# Patient Record
Sex: FEMALE | Race: BLACK OR AFRICAN AMERICAN | NOT HISPANIC OR LATINO | Employment: UNEMPLOYED | URBAN - METROPOLITAN AREA
[De-identification: names, ages, dates, MRNs, and addresses within clinical notes are randomized per-mention and may not be internally consistent; named-entity substitution may affect disease eponyms.]

---

## 2018-05-07 ENCOUNTER — HOSPITAL ENCOUNTER (EMERGENCY)
Facility: HOSPITAL | Age: 53
Discharge: HOME | End: 2018-05-07
Payer: MEDICAID

## 2018-05-07 VITALS
OXYGEN SATURATION: 99 % | WEIGHT: 179 LBS | DIASTOLIC BLOOD PRESSURE: 90 MMHG | BODY MASS INDEX: 32.94 KG/M2 | RESPIRATION RATE: 17 BRPM | SYSTOLIC BLOOD PRESSURE: 179 MMHG | HEART RATE: 97 BPM | HEIGHT: 62 IN | TEMPERATURE: 98 F

## 2018-05-07 DIAGNOSIS — N81.2 INCOMPLETE UTERINE PROLAPSE: Primary | ICD-10-CM

## 2018-05-07 PROCEDURE — 99283 EMERGENCY DEPT VISIT LOW MDM: CPT

## 2018-05-07 NOTE — ED PROVIDER NOTES
There are no discharge medications for this patient.   eMERGENCY dEPARTMENT eNCOUnter    CHIEF COMPLAINT    Chief Complaint   Patient presents with    Vaginal Bleeding     pt states she has a blood clot coming from her vagina noticed about 30 min pta when she went to the restroom. states this is the appropriate time for her cycle. denies any pain.        HPI    Corrine Malik is a 52 y.o. female who presents to the ED with blood clot in vagina. States on cycle now and went to bathroom to urinate and felt pressure. States feels like a clot or something is in there. She further reports that she coughed while urinating. She denies dysuria. She is a A0.      CURRENT MEDICATIONS    No current facility-administered medications on file prior to encounter.      Current Outpatient Prescriptions on File Prior to Encounter   Medication Sig Dispense Refill    [DISCONTINUED] hydrocodone-acetaminophen 5-325mg (NORCO) 5-325 mg per tablet Take 1 tablet by mouth every 6 (six) hours as needed for Pain.      [DISCONTINUED] ibuprofen (ADVIL,MOTRIN) 800 MG tablet Take 1 tablet (800 mg total) by mouth 3 (three) times daily. 20 tablet 0         ALLERGIES    Review of patient's allergies indicates:   Allergen Reactions    Peas Swelling       PAST MEDICAL HISTORY  History reviewed. No pertinent past medical history.    SURGICAL HISTORY    History reviewed. No pertinent surgical history.    SOCIAL HISTORY    Social History     Social History    Marital status: Single     Spouse name: N/A    Number of children: N/A    Years of education: N/A     Social History Main Topics    Smoking status: Never Smoker    Smokeless tobacco: Never Used    Alcohol use No    Drug use: No    Sexual activity: Not Asked     Other Topics Concern    None     Social History Narrative    None       FAMILY HISTORY    History reviewed. No pertinent family history.    REVIEW OF SYSTEMS   ROS  Constitutional:  No fever, chills, weight loss or  "weakness.   Eyes:  No  Photophobia, blurred vision or discharge.   HENT:  No ear pain, nasal congestion or sore throat..  Respiratory:  No cough, shortness of breath or wheezing.   Cardiovascular:  No chest pain, palpitations or swelling.   GI:  No abdominal pain, nausea, vomiting, or diarrhea.  : No dysuria, frequency. Reports vaginal pressure.   Musculoskeletal:  No back pain or neck pain.   Skin:  No reported rashes or infected lesions.   Neurologic:  No reported headache  All Systems otherwise negative except as noted in the History of Present Illness.        PHYSICAL EXAM    Reviewed Triage Note  VITAL SIGNS: BP (!) 199/99   Pulse 107   Temp 98.3 °F (36.8 °C) (Oral)   Resp 16   Ht 5' 2" (1.575 m)   Wt 81.2 kg (179 lb)   LMP 05/04/2018   SpO2 99%   BMI 32.74 kg/m²    Vitals:    05/07/18 1326   BP: (!) 199/99   Pulse: 107   Resp: 16   Temp: 98.3 °F (36.8 °C)       Physical Exam  Nursing Notes and Vital Signs Reviewed  Constitutional:  Well-developed, well-nourished, elderly black female in NAD.  HENT:  Normocephalic, atraumatic. Bilateral external EACs normal. Nose normal, no rhinorrhea. Mouth mucus membranes P & M.   Eyes:  PERRL EOMI. Conjunctiva normal without discharge.   Neck: Normal range of motion. No midline tenderness or vertebral step-off. No stridor. No meningismus. No lymphadenopathy.   Respiratory:  Normal breath sounds bilaterally.  No respiratory distress, retractions, or conversational dyspnea. No wheezing. No rhonchi. No rales.   Cardiovascular:  Normal heart rate. Normal rhythm. No pitting lower extremity edema.   GI:  Abdomen soft, non-distended, non-tender. Normal bowel sounds. No guarding, rigidity or rebound.    : No CVA tenderness. No genital lesions. Scant blood noted on exam. Uterus appears to be prolapsed into the vaginal canal, but is not outside the body.   Integument: No rash. No petechiae.  Neurologic:   Alert and Interactive. MAEW. Gait steady.   Psychiatric:  Affect " normal. Mood normal.         LABS  Pertinent labs reviewed. (See chart for details)           RADIOLOGY    Imaging Results    None         PROCEDURES    Procedures      EKG         ED COURSE & MEDICAL DECISION MAKING    Pertinent & Imaging studies reviewed. (See chart for details and specific orders.)  No heavy bleeding. No clots or tissue in the vault. Appears to have uterine prolapse. No difficulty urinating or defecating. Advised f/u GYN return if concerns.    Medications - No data to display        FINAL IMPRESSION    1. Incomplete uterine prolapse        Differential Diagnosis: Threatened AB                                        Bladder Prolapse                                        UTI     Patient advised to follow-up with PCP for re-check and BP re-check.                   Blanca Jacobson, SARAH  05/07/18 [id number]

## 2018-09-13 ENCOUNTER — HOSPITAL ENCOUNTER (OUTPATIENT)
Dept: RADIOLOGY | Facility: HOSPITAL | Age: 53
Discharge: HOME | End: 2018-09-13
Attending: NURSE PRACTITIONER
Payer: MEDICAID

## 2018-09-13 ENCOUNTER — HOSPITAL ENCOUNTER (OUTPATIENT)
Facility: HOSPITAL | Age: 53
LOS: 1 days | Discharge: HOME | End: 2018-09-14
Attending: EMERGENCY MEDICINE | Admitting: INTERNAL MEDICINE
Payer: MEDICAID

## 2018-09-13 DIAGNOSIS — R07.9 CHEST PAIN: ICD-10-CM

## 2018-09-13 DIAGNOSIS — R06.02 SHORTNESS OF BREATH: ICD-10-CM

## 2018-09-13 DIAGNOSIS — D64.9 ANEMIA: ICD-10-CM

## 2018-09-13 DIAGNOSIS — R06.02 SHORTNESS OF BREATH: Primary | ICD-10-CM

## 2018-09-13 DIAGNOSIS — D64.9 SYMPTOMATIC ANEMIA: ICD-10-CM

## 2018-09-13 LAB
ABO + RH BLD: NORMAL
APTT BLDCRRT: 21.9 SEC
BLD GP AB SCN CELLS X3 SERPL QL: NORMAL
FERRITIN SERPL-MCNC: 3 NG/ML
INR PPP: 1.1
PROTHROMBIN TIME: 11.3 SEC

## 2018-09-13 PROCEDURE — 93010 ELECTROCARDIOGRAM REPORT: CPT | Mod: ,,, | Performed by: INTERNAL MEDICINE

## 2018-09-13 PROCEDURE — 85610 PROTHROMBIN TIME: CPT

## 2018-09-13 PROCEDURE — 86901 BLOOD TYPING SEROLOGIC RH(D): CPT

## 2018-09-13 PROCEDURE — 83540 ASSAY OF IRON: CPT

## 2018-09-13 PROCEDURE — 36430 TRANSFUSION BLD/BLD COMPNT: CPT

## 2018-09-13 PROCEDURE — G0378 HOSPITAL OBSERVATION PER HR: HCPCS

## 2018-09-13 PROCEDURE — 86920 COMPATIBILITY TEST SPIN: CPT

## 2018-09-13 PROCEDURE — 99285 EMERGENCY DEPT VISIT HI MDM: CPT

## 2018-09-13 PROCEDURE — 93005 ELECTROCARDIOGRAM TRACING: CPT

## 2018-09-13 PROCEDURE — 25000003 PHARM REV CODE 250: Performed by: OBSTETRICS & GYNECOLOGY

## 2018-09-13 PROCEDURE — 82728 ASSAY OF FERRITIN: CPT

## 2018-09-13 PROCEDURE — P9021 RED BLOOD CELLS UNIT: HCPCS

## 2018-09-13 PROCEDURE — 99219 PR INITIAL OBSERVATION CARE,LEVL II: CPT | Mod: ,,, | Performed by: OBSTETRICS & GYNECOLOGY

## 2018-09-13 PROCEDURE — 71046 X-RAY EXAM CHEST 2 VIEWS: CPT | Mod: TC,FY,PO

## 2018-09-13 PROCEDURE — 85730 THROMBOPLASTIN TIME PARTIAL: CPT

## 2018-09-13 RX ORDER — MEDROXYPROGESTERONE ACETATE 2.5 MG/1
10 TABLET ORAL 2 TIMES DAILY
Status: DISCONTINUED | OUTPATIENT
Start: 2018-09-13 | End: 2018-09-14 | Stop reason: HOSPADM

## 2018-09-13 RX ORDER — HYDROCODONE BITARTRATE AND ACETAMINOPHEN 500; 5 MG/1; MG/1
TABLET ORAL
Status: DISCONTINUED | OUTPATIENT
Start: 2018-09-13 | End: 2018-09-14 | Stop reason: HOSPADM

## 2018-09-13 RX ORDER — MEDROXYPROGESTERONE ACETATE 10 MG/1
10 TABLET ORAL 2 TIMES DAILY
Qty: 30 TABLET | Refills: 0 | Status: SHIPPED | OUTPATIENT
Start: 2018-09-13 | End: 2018-09-28 | Stop reason: SDUPTHER

## 2018-09-13 RX ADMIN — MEDROXYPROGESTERONE ACETATE 10 MG: 2.5 TABLET ORAL at 09:09

## 2018-09-13 NOTE — ED NOTES
Admitting team notified of D dimer result. MD also notified tachycardia on exertion. States will call back with further instructions. MD later called back and states ok for pt to go to the floor.

## 2018-09-13 NOTE — ED PROVIDER NOTES
Encounter Date: 9/13/2018    SCRIBE #1 NOTE: I, Rajesh Cespedes, am scribing for, and in the presence of,  Dr. Cain Posadas. I have scribed the entire note.       History     Chief Complaint   Patient presents with    Other     c/o low blood count sent here from blood tansfusion.      Corrine Malik is a 53 y.o. female who presents to the ED for further evaluation of low blood count. Patient reports last week had a prolapsed uterus. She had an appointment with PCP today and blood work done with . States last month she was bleeding heavily. She admits to intermittent heavy vaginal bleeding that occurs every other day, currently resolved and some SOB with exertion. She denies any skin color changes, headache, chest pain, vomiting, diarrhea, black/bloody stool, focal numbness or tingling. Patient states her PCP believes bleeding could be from fibroids and not from prolapsed uterus. She denies any pain.        The history is provided by the patient.     Review of patient's allergies indicates:   Allergen Reactions    Peas Swelling     No past medical history on file.  No past surgical history on file.  No family history on file.  Social History     Tobacco Use    Smoking status: Never Smoker    Smokeless tobacco: Never Used   Substance Use Topics    Alcohol use: No    Drug use: No     Review of Systems   Constitutional: Negative for chills and fever.   HENT: Negative for congestion, rhinorrhea and sore throat.    Eyes: Negative for redness and visual disturbance.   Respiratory: Positive for shortness of breath. Negative for cough and wheezing.    Cardiovascular: Negative for chest pain and palpitations.   Gastrointestinal: Negative for abdominal pain, blood in stool, diarrhea, nausea and vomiting.   Genitourinary: Positive for vaginal bleeding. Negative for dysuria and hematuria.   Musculoskeletal: Negative for back pain, myalgias and neck pain.   Skin: Negative for color change and rash.   Neurological: Negative for  dizziness, weakness, light-headedness, numbness and headaches.   Psychiatric/Behavioral: Negative for confusion.   All other systems reviewed and are negative.      Physical Exam     Initial Vitals [09/13/18 1427]   BP Pulse Resp Temp SpO2   (!) 149/73 109 20 98.4 °F (36.9 °C) 98 %      MAP       --         Physical Exam    Nursing note and vitals reviewed.  Constitutional: She appears well-developed and well-nourished. No distress.   HENT:   Head: Normocephalic and atraumatic.   Eyes: EOM are normal. Pupils are equal, round, and reactive to light.   Pale conjunctivae.   Neck: Normal range of motion. Neck supple.   Cardiovascular: Regular rhythm, normal heart sounds and intact distal pulses.   No murmur heard.  Tachycardia.   Pulmonary/Chest: Breath sounds normal. No respiratory distress. She has no wheezes.   Abdominal: Soft. She exhibits no distension. There is no tenderness.   Genitourinary:   Genitourinary Comments: Some blood noted in vaginal vault. No signs of active hemorrhage  Uterus prolapse.   External genitalia normal.   Musculoskeletal: Normal range of motion. She exhibits no edema or tenderness.   Neurological: She is alert and oriented to person, place, and time. She has normal strength.   Skin: Skin is dry.   Psychiatric: She has a normal mood and affect. Thought content normal.         ED Course   Procedures  Labs Reviewed   FERRITIN - Abnormal; Notable for the following components:       Result Value    Ferritin 3 (*)     All other components within normal limits   PROTIME-INR   APTT   FERRITIN   IRON AND TIBC   TYPE & SCREEN     EKG Readings: (Independently Interpreted)   EKG:Rate: 101 bpm. Sinus tachycardia. LBBB. Sgarbossa negative       Imaging Results          US Pelvis Comp with Transvag NON-OB (xpd (Final result)  Result time 09/13/18 16:36:44    Final result by Georgette Clemente MD (09/13/18 16:36:44)                 Impression:      Numerous uterine fibroids.    The endometrium could not  be assessed, it is not well visualized.      Electronically signed by: Georgette Clemente MD  Date:    09/13/2018  Time:    16:36             Narrative:    EXAMINATION:  US PELVIS COMP WITH TRANSVAG NON-OB (XPD)    CLINICAL HISTORY:  vaginal bleeding x 2 months hg 5.8;    TECHNIQUE:  Transabdominal sonography of the pelvis was performed, followed by transvaginal sonography to better evaluate the uterus and ovaries.    COMPARISON:  None.    FINDINGS:  Uterus:    Size: 14.4 x 6.9 x 9.1 cm    Masses: There are numerous uterine fibroids, 3 are measured, one intramural inferiorly measure 5.7 cm.  One intramural near the fundus measures 6.3 cm.  One subserosal posteriorly measures 3.6 cm.    Endometrium: It is not well seen..    The bilateral ovaries were not visualized.  The bilateral adnexa appear normal.    Free Fluid:    None.                                 Medical Decision Making:   Initial Assessment:   Patient presents with hemoglobin below 6 shortness of breath and increased fatigue.  Vital signs stable will perform pelvic exam type and screen and admit  Differential Diagnosis:   GI bleed, iron deficiency, anemia, megaloblastic anemia, due to blood loss  Clinical Tests:   Lab Tests: Ordered and Reviewed  Radiological Study: Ordered and Reviewed  Medical Tests: Ordered and Reviewed  ED Management:  4:56 PM Case discussed with Dr. Cormier with OBGYN, no indication for emergent D&C at this time, recommends transfusion and arrange clinic follow up.    5:15 Case discussed with [facility] Medicine Resident who will admit patient.                   ED Course as of Sep 13 1652   Thu Sep 13, 2018   1647 Transvaginal ultrasound shows numerous uterine fibroid US Pelvis Comp with Transvag NON-OB (xpd [RN]   1648 US Pelvis Comp with Transvag NON-OB (xpd [RN]      ED Course User Index  [RN] Cain Posadas Jr., MD     Clinical Impression:     1. Chest pain    2. Anemia    3. Symptomatic anemia                  I, Cain Psoadas,   personally performed the services described in this documentation. All medical record entries made by the scribe were at my direction and in my presence.  I have reviewed the chart and agree that the record reflects my personal performance and is accurate and complete. Cain Posadas M.D. 1:02 PM09/16/2018                 Cain Posadas Jr., MD  09/16/18 [id number]

## 2018-09-13 NOTE — ED NOTES
Pt sitting upright in bed, a/a/ao x4. NAD noted. Respirations even, unlabored.  Will continue to monitor.

## 2018-09-13 NOTE — H&P
[facility] Medicine H&P Note     Admitting Team: [facility] Hospitalist Team B  Attending Physician: Dr. Jackson  Resident: Dr. Barnes  Intern: Dr. Timmons     Date of Admit: 2018    Chief Complaint     Shortness of breath x2, abnormal lab (c/o low blood count per PCP, sent here for blood transfusion)    Subjective:      History of Present Illness:  Corrine Malik is a  53 y.o. female who has no past medical history. The patient presented to [facility] on 2018 with a primary complaint of shortness of breath x2 and abnormal lab (c/o low blood count per PCP, sent here for blood transfusion).    Patient was in her usual state of health, independent of all ADLs, until 2 weeks prior to admit. Patient states she was rushing through the airport and had acute shortness of breath requiring her to rest several times before getting to her car. Dyspnea resolved with rest and patient had no further episodes until morning of admit when she was rushing to get to a doctor's appointment. At PCP's office, H/H was 5.8/20.7 and patient instructed to come to ED. Patient denies chest pain or LE swelling. Does report non-productive cough x3 days.     Notably patient has had almost daily vaginal bleeding for the last three months. Diagnosed with an incomplete prolapsed uterus during ED visit 2018 for vaginal bleeding/clot, instructed to follow up with PCP or Gyn for further episodes which patient did not do. Reports bleeding occurs almost daily, alternates between heavy clots and spotting. Cannot quantify number of menstrual pads she is using. Patient has been post-menopausal for ~8 years. She reports a family history of uterine fibroids in her mother and four sisters, many of whom have had hysterectomies as a result. Patient denies abdominal pain, h/o abdominal surgeries, hematemesis, melena, history of bleeding disorder. Patient is not actively bleeding.      Past Medical History:  Per patient, no past  "medical history.    Past Surgical History:  Per patient, no past surgical history.    Allergies:  Review of patient's allergies indicates:   Allergen Reactions    Peas Swelling       Home Medications:  None    Family History:  Mother - fibroids  4 sisters - fibroids  Father - HTN  Maternal grandmother - DM     Social History:  Tobacco - denies  Etoh - denies  Drugs - denies     Review of Systems:  All other systems are reviewed and are negative.    Health Maintaince :   Primary Care Physician: Dr. Garland    Immunizations:   TDap: not up to date     Flu: not up to date   Pna: not up to date     Cancer Screening:  PAP: per patient, last 3 years ago, no h/o abnormal  MMG: per patient, last 3 years ago, no h/o abnormal  Colonoscopy: per patient, no prior screenings      Objective:   Last 24 Hour Vital Signs:  BP  Min: 149/73  Max: 160/83  Temp  Av.4 °F (36.9 °C)  Min: 98.4 °F (36.9 °C)  Max: 98.4 °F (36.9 °C)  Pulse  Av  Min: 87  Max: 109  Resp  Av.3  Min: 16  Max: 20  SpO2  Av %  Min: 98 %  Max: 100 %  Height  Av' 2" (157.5 cm)  Min: 5' 2" (157.5 cm)  Max: 5' 2" (157.5 cm)  Weight  Av.6 kg (180 lb)  Min: 81.6 kg (180 lb)  Max: 81.6 kg (180 lb)  Body mass index is 32.92 kg/m².  No intake/output data recorded.    Physical Examination:    BP (!) 160/83   Pulse 83   Temp 98.4 °F (36.9 °C) (Oral)   Resp 16   Ht 5' 2" (1.575 m)   Wt 81.6 kg (180 lb)   SpO2 100%   BMI 32.92 kg/m²     General Appearance:    Awake and alert, in no apparent distress, well nourished and well developed, cooperative and pleasant on interview   Head:    Normocephalic, without obvious abnormality, atraumatic   Eyes:    PERRL, conjunctiva/corneas clear, EOM's intact, no scleral icterus, no conjunctival pallor    Ears:    Normal TM's and external ear canals, both ears   Nose:   Nares normal, septum midline, mucosa normal, no drainage    or sinus tenderness   Throat:   Lips, mucosa, and tongue normal; teeth and gums " normal; moist mucus membranes    Neck:   Supple, symmetrical, trachea midline, no adenopathy;     thyroid:  no enlargement/tenderness/nodules; no carotid    bruit or JVP   Lungs:     Clear to auscultation bilaterally, respirations unlabored, no increased work of breathing    Heart:    Regular rhythm, normal rate, S1 and S2 normal, no murmur, rub   or gallop   Abdomen:     Soft, non-tender, bowel sounds active all four quadrants,     no masses, no organomegaly   Extremities:   Extremities normal, atraumatic, no cyanosis or edema   Pulses:   2+ and symmetric (DP and radial)   Skin:   Skin color, texture, turgor normal, no rashes/lesions/bruising   Lymph nodes:   Cervical, supraclavicular, and axillary nodes normal   Neurologic:   AAOx3, GCS 15, no focal neurologic deficits noted          Laboratory:  Most Recent Data:  CBC:   Lab Results   Component Value Date    WBC 10.95 09/13/2018    HGB 5.8 (LL) 09/13/2018    HCT 20.7 (L) 09/13/2018     09/13/2018    MCV 70 (L) 09/13/2018    RDW 17.6 (H) 09/13/2018     WBC Differential: 56% N, 34.6% L, 7.2% M, 1.6% Eo, 0.3% Baso    BMP:   Lab Results   Component Value Date     09/13/2018    K 3.5 09/13/2018     09/13/2018    CO2 24 09/13/2018    BUN 15 09/13/2018    CREATININE 0.88 09/13/2018    GLU 96 09/13/2018    CALCIUM 9.6 09/13/2018     LFTs:   Lab Results   Component Value Date    PROT 8.1 09/13/2018    ALBUMIN 4.3 09/13/2018    BILITOT 0.2 09/13/2018    AST 19 09/13/2018    ALKPHOS 82 09/13/2018    ALT 14 09/13/2018     Coags:   Lab Results   Component Value Date    INR 1.1 09/13/2018     FLP:   Lab Results   Component Value Date    CHOL 156 09/13/2018    HDL 38 (L) 09/13/2018    LDLCALC 102.6 09/13/2018    TRIG 77 09/13/2018    CHOLHDL 24.4 09/13/2018     DM:   Lab Results   Component Value Date    HGBA1C 5.2 09/13/2018    LDLCALC 102.6 09/13/2018    CREATININE 0.88 09/13/2018     Thyroid:   Lab Results   Component Value Date    TSH 1.100 09/13/2018        Trended Lab Data:  Recent Labs   Lab  09/13/18   1056   WBC  10.95   HGB  5.8*   HCT  20.7*   PLT  260   MCV  70*   RDW  17.6*   NA  140   K  3.5   CL  107   CO2  24   BUN  15   CREATININE  0.88   GLU  96   PROT  8.1   ALBUMIN  4.3   BILITOT  0.2   AST  19   ALKPHOS  82   ALT  14       Trended Cardiac Data:  No results for input(s): TROPONINI, CKTOTAL, CKMB, BNP in the last 168 hours.    Microbiology Data:  None this admit     Other Results:  9/13 Admit EKG (my interpretation): normal sinus rhythm     Radiology:  Imaging Results          US Pelvis Comp with Transvag NON-OB (xpd (Final result)  Result time 09/13/18 16:36:44    Final result by Georgette Clemente MD (09/13/18 16:36:44)                 Impression:      Numerous uterine fibroids.    The endometrium could not be assessed, it is not well visualized.      Electronically signed by: Georgette Clemente MD  Date:    09/13/2018  Time:    16:36             Narrative:    EXAMINATION:  US PELVIS COMP WITH TRANSVAG NON-OB (XPD)    CLINICAL HISTORY:  vaginal bleeding x 2 months hg 5.8;    TECHNIQUE:  Transabdominal sonography of the pelvis was performed, followed by transvaginal sonography to better evaluate the uterus and ovaries.    COMPARISON:  None.    FINDINGS:  Uterus:    Size: 14.4 x 6.9 x 9.1 cm    Masses: There are numerous uterine fibroids, 3 are measured, one intramural inferiorly measure 5.7 cm.  One intramural near the fundus measures 6.3 cm.  One subserosal posteriorly measures 3.6 cm.    Endometrium: It is not well seen..    The bilateral ovaries were not visualized.  The bilateral adnexa appear normal.    Free Fluid:    None.                                 Assessment:     Corrine Malik is a 53 y.o. female with:  Patient Active Problem List    Diagnosis Date Noted    Anemia 09/13/2018    Symptomatic anemia 09/13/2018        Plan:     Symptomatic anemia with MUNOZ   - pt reports shortness of breath with exertion x2 (first episode 2 weeks  ago, second on day of admit)  - has had almost daily vaginal bleeding x3 months, multiple uterine fibroids seen on transvaginal ultrasound    - H/H prior to admit: 5.8/20.7  - pt not actively bleeding, hemodynamically stable, otherwise asymptomatic  - will transfuse 2U PRBCs, follow up CBC  - iron studies ordered     Vaginal bleeding   - first reported 5/2018 in ED, diagnosed with uterine prolapse  - 9/13/18 transvaginal ultrasound: multiple uterine fibroids   - Gyn to see, appreciate recs    Elevated D-Dimer   - pt recently traveled by airplane across country when SOB began  - low suspicion for PE as patient has dyspnea only with exertion, intermittent time course unlikely ,stable vitals, denies chest pain   - will order bilateral DVT US, follow up     Code Status:         Bernadine Og MD  [facility] Medicine Firm [facility]       [facility] Medicine Hospitalist Pager numbers:   [facility] Hospitalist Medicine Team A (Adrienne/Yeyo): [phone]  [facility] Hospitalist Medicine Team B (Manuel/Stephanie):  [phone]

## 2018-09-13 NOTE — ED NOTES
While pt moving onto pelvic pillow HR noted in 110s. When pt stopped moving HR decreased to NSR.  NAD noted. Will continue to monitor.

## 2018-09-13 NOTE — ED NOTES
Pt c/o vaginal bleeding for over 1 month. Pt denies daily bleeding, but states when she has bleeding it is heavy with clots. Pt denies bleeding at this time.  Pt states she was seen per PCP today and told to come to ED d/t low H&H and need for transfusion. Pt states she was SOB approx 2 weeks ago while at airport, denies SOB since. Pt denies pain.

## 2018-09-14 ENCOUNTER — OFFICE VISIT (OUTPATIENT)
Dept: OBSTETRICS AND GYNECOLOGY | Facility: CLINIC | Age: 53
End: 2018-09-14
Payer: MEDICAID

## 2018-09-14 VITALS
TEMPERATURE: 98 F | HEIGHT: 62 IN | HEART RATE: 94 BPM | RESPIRATION RATE: 16 BRPM | DIASTOLIC BLOOD PRESSURE: 65 MMHG | SYSTOLIC BLOOD PRESSURE: 122 MMHG | WEIGHT: 180 LBS | BODY MASS INDEX: 33.13 KG/M2 | OXYGEN SATURATION: 98 %

## 2018-09-14 VITALS
BODY MASS INDEX: 33.13 KG/M2 | HEIGHT: 62 IN | SYSTOLIC BLOOD PRESSURE: 148 MMHG | DIASTOLIC BLOOD PRESSURE: 88 MMHG | WEIGHT: 180 LBS

## 2018-09-14 DIAGNOSIS — Z12.31 SCREENING MAMMOGRAM, ENCOUNTER FOR: ICD-10-CM

## 2018-09-14 DIAGNOSIS — Z01.419 ENCOUNTER FOR ANNUAL ROUTINE GYNECOLOGICAL EXAMINATION: Primary | ICD-10-CM

## 2018-09-14 DIAGNOSIS — D64.9 SYMPTOMATIC ANEMIA: ICD-10-CM

## 2018-09-14 DIAGNOSIS — N88.8 MASS OF CERVIX: ICD-10-CM

## 2018-09-14 DIAGNOSIS — D25.9 UTERINE LEIOMYOMA, UNSPECIFIED LOCATION: ICD-10-CM

## 2018-09-14 LAB
ALBUMIN SERPL BCP-MCNC: 3.5 G/DL
ALBUMIN SERPL BCP-MCNC: 3.6 G/DL
ALP SERPL-CCNC: 71 U/L
ALP SERPL-CCNC: 74 U/L
ALT SERPL W/O P-5'-P-CCNC: 12 U/L
ALT SERPL W/O P-5'-P-CCNC: 12 U/L
ANION GAP SERPL CALC-SCNC: 6 MMOL/L
ANION GAP SERPL CALC-SCNC: 7 MMOL/L
ANISOCYTOSIS BLD QL SMEAR: ABNORMAL
AST SERPL-CCNC: 15 U/L
AST SERPL-CCNC: 15 U/L
BASOPHILS # BLD AUTO: 0.02 K/UL
BASOPHILS # BLD AUTO: 0.02 K/UL
BASOPHILS NFR BLD: 0.1 %
BASOPHILS NFR BLD: 0.1 %
BILIRUB SERPL-MCNC: 1 MG/DL
BILIRUB SERPL-MCNC: 1.1 MG/DL
BLD PROD TYP BPU: NORMAL
BLD PROD TYP BPU: NORMAL
BLOOD UNIT EXPIRATION DATE: NORMAL
BLOOD UNIT EXPIRATION DATE: NORMAL
BLOOD UNIT TYPE CODE: 5100
BLOOD UNIT TYPE CODE: 5100
BLOOD UNIT TYPE: NORMAL
BLOOD UNIT TYPE: NORMAL
BUN SERPL-MCNC: 14 MG/DL
BUN SERPL-MCNC: 14 MG/DL
CALCIUM SERPL-MCNC: 8.9 MG/DL
CALCIUM SERPL-MCNC: 9 MG/DL
CHLORIDE SERPL-SCNC: 106 MMOL/L
CHLORIDE SERPL-SCNC: 107 MMOL/L
CO2 SERPL-SCNC: 25 MMOL/L
CO2 SERPL-SCNC: 25 MMOL/L
CODING SYSTEM: NORMAL
CODING SYSTEM: NORMAL
CREAT SERPL-MCNC: 0.8 MG/DL
CREAT SERPL-MCNC: 0.8 MG/DL
DIFFERENTIAL METHOD: ABNORMAL
DIFFERENTIAL METHOD: ABNORMAL
DISPENSE STATUS: NORMAL
DISPENSE STATUS: NORMAL
EOSINOPHIL # BLD AUTO: 0.3 K/UL
EOSINOPHIL # BLD AUTO: 0.4 K/UL
EOSINOPHIL NFR BLD: 2.2 %
EOSINOPHIL NFR BLD: 2.7 %
ERYTHROCYTE [DISTWIDTH] IN BLOOD BY AUTOMATED COUNT: 19.7 %
ERYTHROCYTE [DISTWIDTH] IN BLOOD BY AUTOMATED COUNT: 20.4 %
EST. GFR  (AFRICAN AMERICAN): >60 ML/MIN/1.73 M^2
EST. GFR  (AFRICAN AMERICAN): >60 ML/MIN/1.73 M^2
EST. GFR  (NON AFRICAN AMERICAN): >60 ML/MIN/1.73 M^2
EST. GFR  (NON AFRICAN AMERICAN): >60 ML/MIN/1.73 M^2
GLUCOSE SERPL-MCNC: 113 MG/DL
GLUCOSE SERPL-MCNC: 122 MG/DL
HCT VFR BLD AUTO: 25.7 %
HCT VFR BLD AUTO: 27.3 %
HGB BLD-MCNC: 7.9 G/DL
HGB BLD-MCNC: 8.1 G/DL
HYPOCHROMIA BLD QL SMEAR: ABNORMAL
IRON SERPL-MCNC: 18 UG/DL
LYMPHOCYTES # BLD AUTO: 2.8 K/UL
LYMPHOCYTES # BLD AUTO: 2.9 K/UL
LYMPHOCYTES NFR BLD: 18.8 %
LYMPHOCYTES NFR BLD: 21.3 %
MCH RBC QN AUTO: 21.9 PG
MCH RBC QN AUTO: 22.5 PG
MCHC RBC AUTO-ENTMCNC: 29.7 G/DL
MCHC RBC AUTO-ENTMCNC: 30.7 G/DL
MCV RBC AUTO: 73 FL
MCV RBC AUTO: 74 FL
MONOCYTES # BLD AUTO: 0.6 K/UL
MONOCYTES # BLD AUTO: 0.6 K/UL
MONOCYTES NFR BLD: 4.2 %
MONOCYTES NFR BLD: 4.5 %
NEUTROPHILS # BLD AUTO: 11.1 K/UL
NEUTROPHILS # BLD AUTO: 9.6 K/UL
NEUTROPHILS NFR BLD: 71.4 %
NEUTROPHILS NFR BLD: 74.5 %
NUM UNITS TRANS PACKED RBC: NORMAL
OVALOCYTES BLD QL SMEAR: ABNORMAL
PLATELET # BLD AUTO: 224 K/UL
PLATELET # BLD AUTO: 228 K/UL
PMV BLD AUTO: 10.2 FL
PMV BLD AUTO: 10.6 FL
POIKILOCYTOSIS BLD QL SMEAR: SLIGHT
POLYCHROMASIA BLD QL SMEAR: ABNORMAL
POTASSIUM SERPL-SCNC: 3.5 MMOL/L
POTASSIUM SERPL-SCNC: 3.6 MMOL/L
PROT SERPL-MCNC: 6.8 G/DL
PROT SERPL-MCNC: 7 G/DL
RBC # BLD AUTO: 3.51 M/UL
RBC # BLD AUTO: 3.7 M/UL
SATURATED IRON: 3 %
SODIUM SERPL-SCNC: 138 MMOL/L
SODIUM SERPL-SCNC: 138 MMOL/L
TOTAL IRON BINDING CAPACITY: 562 UG/DL
TRANS ERYTHROCYTES VOL PATIENT: NORMAL ML
TRANSFERRIN SERPL-MCNC: 380 MG/DL
WBC # BLD AUTO: 13.44 K/UL
WBC # BLD AUTO: 14.91 K/UL

## 2018-09-14 PROCEDURE — 99213 OFFICE O/P EST LOW 20 MIN: CPT | Mod: PBBFAC,25,PO | Performed by: OBSTETRICS & GYNECOLOGY

## 2018-09-14 PROCEDURE — 99225 PR SUBSEQUENT OBSERVATION CARE,LEVEL II: CPT | Mod: ,,, | Performed by: OBSTETRICS & GYNECOLOGY

## 2018-09-14 PROCEDURE — 57500 BIOPSY OF CERVIX: CPT | Mod: PBBFAC,PO | Performed by: OBSTETRICS & GYNECOLOGY

## 2018-09-14 PROCEDURE — 88305 TISSUE EXAM BY PATHOLOGIST: CPT | Performed by: PATHOLOGY

## 2018-09-14 PROCEDURE — 36415 COLL VENOUS BLD VENIPUNCTURE: CPT

## 2018-09-14 PROCEDURE — G0378 HOSPITAL OBSERVATION PER HR: HCPCS

## 2018-09-14 PROCEDURE — 85025 COMPLETE CBC W/AUTO DIFF WBC: CPT

## 2018-09-14 PROCEDURE — 99396 PREV VISIT EST AGE 40-64: CPT | Mod: 25,S$PBB,, | Performed by: OBSTETRICS & GYNECOLOGY

## 2018-09-14 PROCEDURE — 99999 PR PBB SHADOW E&M-EST. PATIENT-LVL III: CPT | Mod: PBBFAC,,, | Performed by: OBSTETRICS & GYNECOLOGY

## 2018-09-14 PROCEDURE — P9016 RBC LEUKOCYTES REDUCED: HCPCS

## 2018-09-14 PROCEDURE — 36430 TRANSFUSION BLD/BLD COMPNT: CPT

## 2018-09-14 PROCEDURE — 88305 TISSUE EXAM BY PATHOLOGIST: CPT | Mod: 26,,, | Performed by: PATHOLOGY

## 2018-09-14 PROCEDURE — 25000003 PHARM REV CODE 250: Performed by: OBSTETRICS & GYNECOLOGY

## 2018-09-14 PROCEDURE — 80053 COMPREHEN METABOLIC PANEL: CPT | Mod: 91

## 2018-09-14 PROCEDURE — 57500 BIOPSY OF CERVIX: CPT | Mod: S$PBB,,, | Performed by: OBSTETRICS & GYNECOLOGY

## 2018-09-14 PROCEDURE — 80053 COMPREHEN METABOLIC PANEL: CPT

## 2018-09-14 RX ORDER — MEDROXYPROGESTERONE ACETATE 10 MG/1
10 TABLET ORAL 2 TIMES DAILY
Qty: 14 TABLET | Refills: 0 | Status: SHIPPED | OUTPATIENT
Start: 2018-09-14 | End: 2018-09-14

## 2018-09-14 RX ORDER — MEDROXYPROGESTERONE ACETATE 10 MG/1
10 TABLET ORAL 2 TIMES DAILY
Qty: 60 TABLET | Refills: 1 | Status: CANCELLED | OUTPATIENT
Start: 2018-09-14 | End: 2019-09-14

## 2018-09-14 RX ADMIN — MEDROXYPROGESTERONE ACETATE 10 MG: 2.5 TABLET ORAL at 09:09

## 2018-09-14 NOTE — DISCHARGE SUMMARY
[facility] Medicine Discharge Summary    Primary Team: [facility] Medicine Firm B  Attending Physician: Dr. Jackson  Resident: Dr. Barnes  Intern: Dr. Timmons    Date of Admit: 2018  Date of Discharge: 2018    Discharge to: home  Condition: stable    Discharge Diagnoses     Patient Active Problem List   Diagnosis    Anemia    Symptomatic anemia       Consultants and Procedures     Consultants:  OBGYN    Procedures:   2U PRBCs transfusion    Brief History of Present Illness      Corrine Malik is a  53 y.o. female who has no past medical history. The patient presented to [facility] on 2018 with a primary complaint of shortness of breath x2 and abnormal lab (c/o low blood count per PCP, sent here for blood transfusion).     Patient was in her usual state of health, independent of all ADLs, until 2 weeks prior to admit. Patient states she was rushing through the airport and had acute shortness of breath requiring her to rest several times before getting to her car. Dyspnea resolved with rest and patient had no further episodes until morning of admit when she was rushing to get to a doctor's appointment. At PCP's office, H/H was 5.8/20.7 and patient instructed to come to ED. Patient denies chest pain or LE swelling. Does report non-productive cough x3 days.      Notably patient has had almost daily vaginal bleeding for the last three months. Diagnosed with an incomplete prolapsed uterus during ED visit 2018 for vaginal bleeding/clot, instructed to follow up with PCP or Gyn for further episodes which patient did not do. Reports bleeding occurs almost daily, alternates between heavy clots and spotting. Cannot quantify number of menstrual pads she is using. Patient has been post-menopausal for ~8 years. She reports a family history of uterine fibroids in her mother and four sisters, many of whom have had hysterectomies as a result. Patient denies abdominal pain, h/o abdominal  surgeries, hematemesis, melena, history of bleeding disorder. Patient is not actively bleeding.    For the full HPI please refer to the History & Physical from this admission.    Hospital Course By Problem with Pertinent Findings     Symptomatic anemia with MUNOZ 2/2 vaginal bleeding  - pt reports shortness of breath with exertion x2 (first episode 2 weeks ago, second on day of admit)  - has had almost daily vaginal bleeding x3 months, multiple uterine fibroids seen on transvaginal ultrasound    - H/H prior to admit: 5.8/20.7  - pt not actively bleeding, hemodynamically stable, otherwise asymptomatic  - iron studies: iron 18 / TIBC 562 / transferrin 380  - 9/13 transfused 2U PRBCs, follow up H/H 8.1/27.3 --> 7.9/25.7     Vaginal bleeding, not active at this time   - first reported 5/2018 in ED, diagnosed with uterine prolapse  - 9/13/18 transvaginal ultrasound: multiple uterine fibroids   - Gyn consulted: started on progesterone 10mg bid, follow up for Pap and EMB in office after discharge today       Elevated D-Dimer   - pt recently traveled by airplane across country when SOB began  - low suspicion for PE as patient has dyspnea only with exertion, intermittent time course unlikely ,stable vitals, denies chest pain   - 9/13 bilateral LE US: negative for DVT     HTN  - systolics 122-165 since admit, per patient recently started on medicine with PCP   - defer HTN meds to PCP, discuss on follow up         Discharge Medications        Medication List      START taking these medications    medroxyPROGESTERone 10 MG tablet  Commonly known as:  PROVERA  Take 1 tablet (10 mg total) by mouth 2 (two) times daily.           Where to Get Your Medications      These medications were sent to [facility] [address], [place] [address]    Hours:  Mon-Fri, 8a-5:30p Phone:  [phone]   · medroxyPROGESTERone 10 MG tablet         Discharge Information:   Diet:  Normal, as tolerated     Physical  Activity:  Normal, as tolerated    Instructions:  1. Take all medications as prescribed  2. Keep all follow-up appointments  3. Return to the hospital or call your primary care physicians if any worsening symptoms such as heavy bleeding, abdominal pain, shortness of breath, chest pain, lightheadedness/loss of consciousness occur.      Follow-Up Appointments:  Date Time Provider Department Center   9/21/2018 10:40 AM Omari Garland DO [facility]   10/1/2018  9:30 AM Katy Cormier MD [facility] OBVEENA Og M.D.  [facility] Internal Medicine, [facility] Medicine Firm B

## 2018-09-14 NOTE — PLAN OF CARE
Problem: Patient Care Overview  Goal: Plan of Care Review  Outcome: Ongoing (interventions implemented as appropriate)  Pt AAOx4, no family members at bedside throughout shift. Pt denies pain, n/v/d, and SOB. 2/2 units of RBC infusing. Cardiac diet tolerated well. OBGYN consult completed. Pt ambulating and voiding without difficulties. Cardiac monitoring initiated. Safety maintained, bed alarm on - will cont to monitor.

## 2018-09-14 NOTE — CONSULTS
2018    Chief complaint: sent from clinic    HPI: 53 y.o. yo  who was sent to the ER from her PCP for a low H/H. She denies symptoms. She denies dizziness or lightheaded. She reports she had gone through menopause several years ago then had light bleeding in May then started having heavy bleeding. She reports sometimes she has golf ball size clots and sometimes it is just spotting. She is currently not bleeding.     GYN history:   No history of abn pap, no pelvic surgeries    OB history: 5      No past medical history on file.  No past surgical history on file.  Social History     Socioeconomic History    Marital status: Single     Spouse name: Not on file    Number of children: Not on file    Years of education: Not on file    Highest education level: Not on file   Social Needs    Financial resource strain: Not on file    Food insecurity - worry: Not on file    Food insecurity - inability: Not on file    Transportation needs - medical: Not on file    Transportation needs - non-medical: Not on file   Occupational History    Not on file   Tobacco Use    Smoking status: Never Smoker    Smokeless tobacco: Never Used   Substance and Sexual Activity    Alcohol use: No    Drug use: No    Sexual activity: Not on file   Other Topics Concern    Not on file   Social History Narrative    Not on file     No family history on file.  Review of patient's allergies indicates:   Allergen Reactions    Peas Swelling     No meds    ROS:   GENERAL: Denies weight gain or weight loss. Feeling well overall.   SKIN: Denies rash or lesions.   HEAD: Denies head injury or headache.   CHEST: Denies chest pain or shortness of breath.   CARDIOVASCULAR: Denies palpitations or left sided chest pain.   ABDOMEN: No abdominal pain, constipation, diarrhea, nausea, vomiting or rectal bleeding.   URINARY: No frequency, dysuria, hematuria, or burning on urination.    Temp:  [97.1 °F (36.2 °C)-98.4 °F (36.9 °C)]   Pulse:   []   Resp:  [16-20]   BP: (142-161)/(73-83)   SpO2:  [98 %-100 %]     APPEARANCE: Well nourished, well developed, in no acute distress.  NEUROLOGIC: orientated to person, place and time, normal mood and affect   NECK: no masses, tracheal position normal, thyroid not enlarged, no masses   SKIN: no rashes or lesions  RESPIRATORY: normal respiratory effort, no use of accessory muscles   CARDIOVASCULAR: RRR, no murmurs, extremities normal with no edema    ABDOMEN: Soft. No tenderness or masses. No hernias  PELVIC: unable to do exam as patient was being taken to US     Uterus:Size: 14.4 x 6.9 x 9.1 cm  Masses: There are numerous uterine fibroids, 3 are measured, one intramural inferiorly measure 5.7 cm.  One intramural near the fundus measures 6.3 cm.  One subserosal posteriorly measures 3.6 cm.  Endometrium: It is not well seen..  The bilateral ovaries were not visualized.  The bilateral adnexa appear normal.  Free Fluid:None.    H/H: 5.8/20.7, MCV 70  TSH wnl and A1C normal and CMP wnl     A/P:   1. Microcytic anemia: likely iron deficient 2/2 AUB but waiting for tibc/ferritin. Medicine team has admitted for transfusion and will get 2 units.   2. PMB: discussed with her that vaginal bleeding after going through menopause even if she has fibroids isn't normal. Will do full pelvic exam in the am, will need to do pap smear and EMB in the office. Will add provera 10 mg BID but discussed with her the importance of being seen in the office so we can evaluate for endometrial hyperplasia or cancer.    3. Elevated BP: likely CHTN but will defer to medicine team for management.

## 2018-09-14 NOTE — PLAN OF CARE
Discharge orders noted, no HH or HME ordered.    Future Appointments   Date Time Provider Department Center   9/21/2018 10:40 AM Omari Garland DO [facility]   10/1/2018  9:30 AM Katy Cormier MD [facility]       Pt's nurse will go over medications/signs and symptoms prior to discharge       09/14/18 1256   Final Note   Assessment Type Final Discharge Note   Discharge Disposition Home   What phone number can be called within the next 1-3 days to see how you are doing after discharge? [phone]   Hospital Follow Up  Appt(s) scheduled? Yes   Right Care Referral Info   Post Acute Recommendation No Care     Gricel Maxwell, RN Transitional Navigator  [phone]

## 2018-09-14 NOTE — PROGRESS NOTES
[facility]  Obstetrics & Gynecology  Progress Note    Patient Name: Corrine Malik  MRN: [id number]  Admission Date: 9/13/2018  Primary Care Provider: Primary Doctor No  Principal Problem: Symptomatic anemia    Subjective:     Interval History: Patient admitted for AUB and anemia. She reports she had no bleeding overnight and she feels improved after the transfusion.     Scheduled Meds:   medroxyPROGESTERone  10 mg Oral BID     Continuous Infusions:  PRN Meds:sodium chloride    Review of patient's allergies indicates:   Allergen Reactions    Peas Swelling       Objective:     Vital Signs (Most Recent):  Temp: 98.2 °F (36.8 °C) (09/14/18 0802)  Pulse: 82 (09/14/18 0802)  Resp: 16 (09/14/18 0802)  BP: 122/65 (09/14/18 0802)  SpO2: 98 % (09/13/18 1906) Vital Signs (24h Range):  Temp:  [97.1 °F (36.2 °C)-98.4 °F (36.9 °C)] 98.2 °F (36.8 °C)  Pulse:  [] 82  Resp:  [14-20] 16  SpO2:  [98 %-100 %] 98 %  BP: (122-165)/(65-83) 122/65     Weight: 81.6 kg (180 lb)  Body mass index is 32.92 kg/m².  Patient's last menstrual period was 09/13/2011 (approximate).    I&O (Last 24H):    Intake/Output Summary (Last 24 hours) at 9/14/2018 0846  Last data filed at 9/14/2018 0340  Gross per 24 hour   Intake 624.58 ml   Output --   Net 624.58 ml       Physical Exam  On speculum exam about a 4 cm mass protruding through the cervix, doesn't appear to be attached to the cervix, appears to a fibroid, no obvious cause of bleeding and scant bleeding seen.     Laboratory:  H/H: 5.8/20.7-> 7.9/25.7  Ferritin: low         Assessment/Plan:     Active Diagnoses:    Diagnosis Date Noted POA    PRINCIPAL PROBLEM:  Symptomatic anemia [D64.9] 09/13/2018 Yes    Anemia [D64.9] 09/13/2018 Yes      Problems Resolved During this Admission:       1. Likely a prolapsing fibroid but discussed with patient always a concern for cancer when there is a mass from the cervix, she has no h/o abn pap smears but hasn't seen a gyn in several  years. Exam is limited due to equipement in hospital since her H/H responded appropriately and her bleeding as stopped discussed with primary team to discharge and send her to my office so we can biopsy the mass and do a more complete exam. Discussed with patient plan would be once biopsy resulted as long as it isn't cancerous to do a hysterectomy but will discuss this further in my office.     Katy Cormier MD  Obstetrics & Gynecology  [facility]

## 2018-09-14 NOTE — PROGRESS NOTES
"[facility] Medicine Resident HO-I Progress Note    Subjective:      Ms. Corrine Malik received 2U PRBC's overnight. Tolerated well, denies fever, chills, nausea, vomiting, abdominal pain, chest pain, shortness of breath.     Patient started on progesterone overnight per OBGYN. Reports since then she has had no bleeding episodes.      Objective:   Last 24 Hour Vital Signs:  BP  Min: 122/65  Max: 165/76  Temp  Av.1 °F (36.7 °C)  Min: 97.1 °F (36.2 °C)  Max: 98.4 °F (36.9 °C)  Pulse  Av.5  Min: 79  Max: 109  Resp  Avg: 15.8  Min: 14  Max: 20  SpO2  Av %  Min: 98 %  Max: 100 %  Height  Av' 2" (157.5 cm)  Min: 5' 2" (157.5 cm)  Max: 5' 2" (157.5 cm)  Weight  Av.6 kg (180 lb)  Min: 81.6 kg (180 lb)  Max: 81.6 kg (180 lb)  I/O last 3 completed shifts:  In: 624.6 [Blood:624.6]  Out: -     Physical Examination:  General Appearance:    Awake and alert, in no apparent distress, well nourished and well developed, cooperative and pleasant on interview   Head:    Normocephalic, without obvious abnormality, atraumatic   Eyes:    PERRL, conjunctiva/corneas clear, EOM's intact, no scleral icterus, no conjunctival pallor    Ears:    Normal TM's and external ear canals, both ears   Nose:   Nares normal, septum midline, mucosa normal, no drainage    or sinus tenderness   Throat:   Lips, mucosa, and tongue normal; teeth and gums normal; moist mucus membranes    Neck:   Supple, symmetrical, trachea midline, no adenopathy;     thyroid:  no enlargement/tenderness/nodules; no carotid    bruit or JVP   Lungs:     Clear to auscultation bilaterally, respirations unlabored, no increased work of breathing    Heart:    Regular rhythm, normal rate, S1 and S2 normal, no murmur, rub   or gallop   Abdomen:     Soft, non-tender, bowel sounds active all four quadrants,     no masses, no organomegaly   Extremities:   Extremities normal, atraumatic, no cyanosis or edema   Pulses:   2+ and symmetric (DP and radial)   Skin:   " Skin color, texture, turgor normal, no rashes/lesions/bruising   Lymph nodes:   Cervical, supraclavicular, and axillary nodes normal   Neurologic:   AAOx3, GCS 15, no focal neurologic deficits noted        Laboratory:  Laboratory Data Reviewed: yes  Pertinent Findings:  Trended Lab Data:  Recent Labs   Lab  09/13/18   1056  09/14/18   0349  09/14/18   0602   WBC  10.95  14.91*  13.44*   HGB  5.8*  8.1*  7.9*   HCT  20.7*  27.3*  25.7*   PLT  260  228  224   MCV  70*  74*  73*   RDW  17.6*  20.4*  19.7*   NA  140  138  138   K  3.5  3.6  3.5   CL  107  106  107   CO2  24  25  25   BUN  15  14  14   GLU  96  113*  122*   CALCIUM  9.6  9.0  8.9   PROT  8.1  7.0  6.8   ALBUMIN  4.3  3.6  3.5   BILITOT  0.2  1.1*  1.0   AST  19  15  15   ALKPHOS  82  74  71   ALT  14  12  12       Microbiology Data Reviewed: yes  Pertinent Findings:  None this admit    Other Results:  EKG (my interpretation): normal EKG, normal sinus rhythm, unchanged from previous tracings.    Radiology Data Reviewed: yes  Pertinent Findings:  Imaging Results          US Lower Extremity Veins Bilateral (Final result)  Result time 09/13/18 20:23:47    Final result by Keenan Irving MD (09/13/18 20:23:47)                 Impression:      No evidence of deep venous thrombosis in either lower extremity.      Electronically signed by: Keenan Irving MD  Date:    09/13/2018  Time:    20:23             Narrative:    EXAMINATION:  US LOWER EXTREMITY VEINS BILATERAL    CLINICAL HISTORY:  new onset shortness of breath;    TECHNIQUE:  Duplex and color flow Doppler and dynamic compression was performed of the bilateral lower extremity veins was performed.    COMPARISON:  None    FINDINGS:  Right thigh veins: The common femoral, femoral, popliteal, upper greater saphenous, and deep femoral veins are patent and free of thrombus. The veins are normally compressible and have normal phasic flow and augmentation response.    Right calf veins: The visualized calf  veins are patent.    Left thigh veins: The common femoral, femoral, popliteal, upper greater saphenous, and deep femoral veins are patent and free of thrombus. The veins are normally compressible and have normal phasic flow and augmentation response.    Left calf veins: The visualized calf veins are patent.    Miscellaneous: None                               US Pelvis Comp with Transvag NON-OB (xpd (Final result)  Result time 09/13/18 16:36:44    Final result by Georgette Clemente MD (09/13/18 16:36:44)                 Impression:      Numerous uterine fibroids.    The endometrium could not be assessed, it is not well visualized.      Electronically signed by: Georgette Clemente MD  Date:    09/13/2018  Time:    16:36             Narrative:    EXAMINATION:  US PELVIS COMP WITH TRANSVAG NON-OB (XPD)    CLINICAL HISTORY:  vaginal bleeding x 2 months hg 5.8;    TECHNIQUE:  Transabdominal sonography of the pelvis was performed, followed by transvaginal sonography to better evaluate the uterus and ovaries.    COMPARISON:  None.    FINDINGS:  Uterus:    Size: 14.4 x 6.9 x 9.1 cm    Masses: There are numerous uterine fibroids, 3 are measured, one intramural inferiorly measure 5.7 cm.  One intramural near the fundus measures 6.3 cm.  One subserosal posteriorly measures 3.6 cm.    Endometrium: It is not well seen..    The bilateral ovaries were not visualized.  The bilateral adnexa appear normal.    Free Fluid:    None.                                  Current Medications:     Infusions:       Scheduled:   medroxyPROGESTERone  10 mg Oral BID        PRN:  sodium chloride    Antibiotics and Day Number of Therapy:  None    Lines and Day Number of Therapy:  PIV    Assessment:     Corrine Malik is a 53 y.o.female with  Patient Active Problem List    Diagnosis Date Noted    Anemia 09/13/2018    Symptomatic anemia 09/13/2018        Plan:     Symptomatic anemia with MUNOZ 2/2 vaginal bleeding  - pt reports shortness of breath  with exertion x2 (first episode 2 weeks ago, second on day of admit)  - has had almost daily vaginal bleeding x3 months, multiple uterine fibroids seen on transvaginal ultrasound    - H/H prior to admit: 5.8/20.7  - pt not actively bleeding, hemodynamically stable, otherwise asymptomatic  - 9/13 transfused 2U PRBCs, follow up H/H 8.1/27.3 --> 7.9/25.7  - iron studies: iron 18 / TIBC 562 / transferrin 380     Vaginal bleeding   - first reported 5/2018 in ED, diagnosed with uterine prolapse  - 9/13/18 transvaginal ultrasound: multiple uterine fibroids   - Gyn consulted: started on progesterone 10mg bid, follow up for Pap and EMB in office after discharge today       Elevated D-Dimer   - pt recently traveled by airplane across country when SOB began  - low suspicion for PE as patient has dyspnea only with exertion, intermittent time course unlikely ,stable vitals, denies chest pain   - 9/13 bilateral LE US: negative for DVT    HTN  - systolics 122-165 since admit, per patient recently started on medicine with PCP   - continue home meds at discharge       Bernadine Og M.D.   [facility] Internal Medicine HO-I  [facility] Medicine Service Team B      [facility] Medicine Hospitalist Pager numbers:   [facility] Hospitalist Medicine Team A (Adrienne/Yeyo): [phone]  [facility] Hospitalist Medicine Team B (Manuel/Stephanie):  [phone]

## 2018-09-20 ENCOUNTER — HOSPITAL ENCOUNTER (OUTPATIENT)
Dept: RADIOLOGY | Facility: HOSPITAL | Age: 53
Discharge: HOME | End: 2018-09-20
Attending: OBSTETRICS & GYNECOLOGY
Payer: MEDICAID

## 2018-09-20 DIAGNOSIS — Z12.31 SCREENING MAMMOGRAM, ENCOUNTER FOR: ICD-10-CM

## 2018-09-20 PROCEDURE — 77067 SCR MAMMO BI INCL CAD: CPT | Mod: TC,PO

## 2018-09-21 ENCOUNTER — TELEPHONE (OUTPATIENT)
Dept: OBSTETRICS AND GYNECOLOGY | Facility: CLINIC | Age: 53
End: 2018-09-21

## 2018-09-21 ENCOUNTER — OFFICE VISIT (OUTPATIENT)
Dept: FAMILY MEDICINE | Facility: HOSPITAL | Age: 53
End: 2018-09-21
Attending: FAMILY MEDICINE
Payer: MEDICAID

## 2018-09-21 VITALS
SYSTOLIC BLOOD PRESSURE: 130 MMHG | BODY MASS INDEX: 33.47 KG/M2 | HEIGHT: 62 IN | DIASTOLIC BLOOD PRESSURE: 75 MMHG | HEART RATE: 90 BPM | WEIGHT: 181.88 LBS

## 2018-09-21 DIAGNOSIS — D25.9 UTERINE LEIOMYOMA, UNSPECIFIED LOCATION: ICD-10-CM

## 2018-09-21 DIAGNOSIS — R79.89 LOW VITAMIN D LEVEL: ICD-10-CM

## 2018-09-21 DIAGNOSIS — D50.0 IRON DEFICIENCY ANEMIA DUE TO CHRONIC BLOOD LOSS: ICD-10-CM

## 2018-09-21 DIAGNOSIS — D64.9 SYMPTOMATIC ANEMIA: Primary | ICD-10-CM

## 2018-09-21 DIAGNOSIS — R09.82 POST-NASAL DRIP: ICD-10-CM

## 2018-09-21 PROCEDURE — 99213 OFFICE O/P EST LOW 20 MIN: CPT | Performed by: STUDENT IN AN ORGANIZED HEALTH CARE EDUCATION/TRAINING PROGRAM

## 2018-09-21 RX ORDER — ERGOCALCIFEROL 1.25 MG/1
50000 CAPSULE ORAL
Qty: 4 CAPSULE | Refills: 0 | Status: SHIPPED | OUTPATIENT
Start: 2018-09-21 | End: 2018-10-19

## 2018-09-21 RX ORDER — FERROUS SULFATE 325(65) MG
325 TABLET ORAL 3 TIMES DAILY
Qty: 88 TABLET | Refills: 0 | Status: SHIPPED | OUTPATIENT
Start: 2018-09-21 | End: 2018-10-21

## 2018-09-21 NOTE — PATIENT INSTRUCTIONS
Anemia  Anemia is a condition that occurs when your body does not have enough healthy red blood cells (RBCs). RBCs are the parts of your blood that carry oxygen throughout your body. A protein called hemoglobin allows your RBCs to absorb and release oxygen. Without enough RBCs or hemoglobin, your body doesn't get enough oxygen. Symptoms of anemia may then occur.    What are the symptoms of anemia?  Some people with anemia have no symptoms. But most people have symptoms that range from mild to severe. These can include:  · Tiredness (fatigue)  · Weakness  · Pale skin  · Shortness of breath  · Dizziness or fainting  · Rapid heartbeat  · Trouble doing normal amounts of activity  · Jaundice (yellowing of your eyes, skin, or mouth; dark urine)  What causes anemia?  Anemia can occur when your body:  · Loses too much blood  · Does not make enough RBCs  · Destroys your RBCs at a faster rate than it can replace them  · Does not make a normal amount of hemoglobin in your RBCs  These problems can occur for many reasons, including:  · A condition that you are born with (congenital or inherited), such as sickle cell disease or thalassemia  · Heavy bleeding for any reason, including injury, surgery, childbirth, or even heavy menstrual periods  · Being low in certain nutrients, such as iron, folate, or vitamin B12, possibly from a poor diet or a condition like celiac disease or Crohn's disease  · Certain chronic conditions like diabetes, arthritis, or kidney disease  · Certain chronic infections like tuberculosis or HIV  · Exposure to certain medicines, such as those used for chemotherapy  There are different types of anemia. Your healthcare provider can tell you more about the type of anemia you have and what may have caused it.  How is anemia diagnosed?  To diagnose anemia, your healthcare provider orders blood tests. These can include:  · Complete blood cell count (CBC). This test measures the amounts of the different types of  blood cells.  · Blood smear. This test checks the size and shape of your blood cells. To do the test, a drop of your blood is viewed under a microscope. A stain is used to make the blood cells easier to see.  · Iron studies. These tests measure the amount of iron in your blood. Your body needs iron to make hemoglobin in your RBCs.  · Vitamin B12 and folate studies. These tests check for some of the components that help give RBCs a normal size and shape.  · Reticulocyte count. This test measures the amount of new RBCs that your bone marrow makes.  · Hemoglobin electrophoresis. This test checks for problems with your hemoglobin in RBCs.  How is anemia treated?  Treatment for anemia is based on the type of anemia, its cause, and the severity of your symptoms. Treatments may include:  · Diet changes. This involves increasing the amount of certain nutrients in your diet, such as iron, vitamin B12, or folate. Your healthcare provider may also prescribe nutrient supplements.  · Medicines. Certain medicines treat the cause of your anemia. Others help build new RBCs or relieve symptoms. If a medicine is the cause of your anemia, you may need to stop or change it.  · Blood transfusions. Replacing some of your blood can increase the number of healthy RBCs in your body.  · Surgery. In some cases, your doctor may do surgery to treat the underlying cause of anemia. If you need surgery, your healthcare provider will explain the procedure and outline the risks and benefits for you.  What are the long-term concerns?  If you have a certain type of anemia, you can expect a full recovery after treatment. If you have other types of anemia (especially a type you're born with), you will need to manage it for life. Your doctor can tell you more.    Uterine Fibroids    Fibroids are lumps (growths) of muscle tissue. They can form in the wall of uterus (womb). Fibroids are very common. They are almost always benign (noncancerous). It is not  known what causes fibroids. But they may run in families. Also, changes in female hormones may cause them to grow over time. After menopause, fibroids may stop growing, shrink, or go away.  Fibroids may or may not cause symptoms. This depends on many factors, such as the size, number, and locations of the fibroids. If symptoms do occur, they can include:  · Heavy bleeding (in severe cases, this may lead to a problem called anemia) or painful periods  · Feeling of fullness, pressure, or pain in the lower belly (abdomen) or pelvic region  · Lower back pain  · Frequent urination  · Constipation  · Pain during sex  · Problems getting pregnant  If fibroids are suspected, an evaluation will be done to help understand the extent of the problem. This can include a health history, exam, and tests. Based on the results, treatment can then be planned in needed.  Until more details are known about your problem, you may be given guidelines similar to the home care instructions below.      Home care  · To help control pain, over-the-counter pain medicine may be advised. Take these only as directed by your provider.  · If you have heavy or painful periods, keep a log of your menstrual cycle. Show the log to your provider. The information may help him or her determine if your symptoms are due to fibroids or another cause.  · Make certain lifestyle changes. This may include losing excess weight, being more active, or eating less red meat. These changes may help lower the risk of fibroids in some people. They may also help improve overall health.  Follow-up care  Follow up with your healthcare provider as advised. If testing was done, youll be told the results when they are ready. Treatment options for fibroids may include medicines or procedures to help shrink or keep fibroids from growing. In severe cases, surgery may be needed to remove fibroids or to remove the uterus. If you have fibroids but no symptoms, you may not need  treatment at all. However, your provider may advise regular follow-up to check fibroid size and growth.  When to seek medical advice  Call your healthcare provider right away if any of these occur:  · Heavy bleeding or painful periods that continue or dont get better with treatment  · Signs of anemia such as extreme fatigue, pale skin, shortness of breath with little exertion, or rapid heartbeat  · Weakness, dizziness, or fainting  · Severe pain in the pelvic or belly region  · Swollen or enlarged belly    Vitamin D  Does this test have other names?  25-hydroxyvitamin D (25-high-DROX-ee-VIE-tuh-min D), 25(OH)D  What is this test?  Vitamin D is mainly found in fortified dairy foods, juice, breakfast cereal, and certain fish. This vitamin plays many roles in the body. But because it helps the body absorb calcium from foods and supplements, it's particularly important for bone health. Vitamin D has many additional roles in the body.  Vitamin D comes in several forms. When ultraviolet light, such as sunlight, hits your skin, it creates vitamin D3. D2 is used to fortify dairy foods. Both of these are further processed by your liver and kidneys into a form your body can use. Most tests for vitamin D check the level of a form circulating in the body called 25-hydroxyvitamin D, also called 25(OH)D.   Why do I need this test?  Vitamin D testing has become much more popular in recent years. Your healthcare provider may check your vitamin D levels to find out if you have any risks to bone health. These might be:  · Low calcium  · Soft bones caused by low vitamin D or problems using it (osteomalacia)  · Osteopenia  · Osteoporosis  · Rickets, in children  You may also need this test if you are at risk for low vitamin D levels. Risks include:  · Being an older adult  · Having difficulty absorbing fat from your diet  · Having chronic kidney disease  · Have dark skin pigmentation  · Being a  baby  Vitamin D has many  effects in the body. You may need this test to help your provider diagnose or treat:  · Problems with the parathyroid gland  · Cancer  · Autoimmune diseases such as multiple sclerosis and Crohn's disease  · Psoriasis  · Asthma  · Weakness or falls    What other tests might I have along with this test?  A healthcare provider may also want to check your parathyroid hormone levels and your calcium levels.   What do my test results mean?  A result for a lab test may be affected by many things, including the method the laboratory uses to do the test. If your test results are different from the normal value, you may not have a problem. To learn what the results mean for you, talk with your healthcare provider.  Children and adults need more than 30 nanograms per milliliter (ng/ml) of vitamin D. The optimal level of 25(OH)D is usually between 30 and 60 ng/mL. Recommended daily amounts range from 400 to 800 international units (IU) per day based on your age.  Levels lower than normal can mean you are:  · Not making enough vitamin D on your own  · Not getting enough vitamin D in your diet  · Not absorbing vitamin D from your food as you should  Lower levels may also mean that your body is not converting the vitamin as it should. This might be because of kidney or liver disease.  Above-normal levels may be a sign that you're taking too much in supplement form.   How is this test done?  The test requires a blood sample, which is drawn through a needle from a vein in your arm.  Does this test pose any risks?  Taking a blood sample with a needle carries risks that include bleeding, infection, bruising, and a sense of lightheadedness. When the needle pricks your arm, you may feel a slight stinging sensation or pain. Afterward, the site may be slightly sore.   What might affect my test results?  The amount of time you spend in the sunlight, your diet, and whether you take vitamin D in supplement form can affect your vitamin D  levels. Ask your healthcare provider if any health conditions you have or medicines you take could affect your results.  How do I get ready for this test?  Tell your healthcare provider if you take vitamin D supplements. Also be sure your provider knows about all medicines, herbs, vitamins, and supplements you are taking. This includes medicines that don't need a prescription and any illicit drugs you may use.

## 2018-09-21 NOTE — TELEPHONE ENCOUNTER
----- Message from Andie Dotson sent at 9/21/2018  2:28 PM CDT -----  Contact: [phone]  Patient called in returning your call. Please call.

## 2018-09-21 NOTE — PROGRESS NOTES
Subjective:       Patient ID: Corrine Malik is a 53 y.o. female.    Chief Complaint: Chronic dry cough w/ itchy throat    HPI   Mrs. Corrine Malik is a 52 yo F w/ pmhx of post-menopausal bleeding, iron deficiency anemia, and prolapsed uterine fibroid who presents to the clinic for a cough w/ post nasal drip and a hospital follow-up from the prior week. She was admitted to the hospital in order to receive two units of pRBCs for symptomatic anemia (Hgb 5.8). Her hospital course was unremarkable. She is to f/u w/ OB/GYN on 9/28/18 for both fibroid biopsy results as well as to discuss the possibility for operative intervention. During the visit, she endorses that last night she was up until 3 am w/ passing clots. Used 6 pads yesterday. She is asymptomatic currently which includes but not limited to: SOB, CP, light headed, visual changes, pallor, or heart palpitations. She also denies any fever, chills, nausea, constipation, diarrhea, no urinary symptoms or HA. She has post nasal drip w/ an associated cough. No treatment thus far. Denies any throat pain, no recent sick contacts, no sinus pressure tenderness, and no recent URI. Appears allergic.       Review of Systems   Constitutional: Negative for chills, diaphoresis, fatigue, fever and unexpected weight change.   HENT: Positive for postnasal drip. Negative for congestion, ear pain, mouth sores, nosebleeds, rhinorrhea, sinus pressure, sinus pain, sneezing, sore throat and trouble swallowing.    Eyes: Negative for photophobia, pain and redness.   Respiratory: Positive for cough and shortness of breath. Negative for chest tightness, wheezing and stridor.    Cardiovascular: Negative for chest pain and palpitations.   Gastrointestinal: Negative for abdominal pain, constipation, diarrhea, nausea and vomiting.   Endocrine: Negative for heat intolerance, polyphagia and polyuria.   Genitourinary: Negative for dysuria, flank pain, frequency, hematuria and urgency.    Musculoskeletal: Negative for arthralgias, joint swelling and myalgias.   Skin: Negative for color change, pallor and rash.   Allergic/Immunologic: Negative for environmental allergies and food allergies.   Neurological: Negative for dizziness, tremors, seizures, syncope, weakness, light-headedness and headaches.   Hematological: Negative for adenopathy. Does not bruise/bleed easily.   Psychiatric/Behavioral: Negative for agitation and behavioral problems. The patient is not nervous/anxious.        Objective:      Vitals:    09/21/18 1055   BP: 130/75   Pulse: 90     Physical Exam   Constitutional: She is oriented to person, place, and time. She appears well-developed and well-nourished.   HENT:   Head: Normocephalic and atraumatic.   Mouth/Throat: Oropharynx is clear and moist. No oropharyngeal exudate.   Inflamed nasal turbinates. Boggy and swollen in appearance.    Eyes: EOM are normal. Pupils are equal, round, and reactive to light. No scleral icterus.   Neck: Normal range of motion. Neck supple.   Cardiovascular: Normal rate, regular rhythm, normal heart sounds and intact distal pulses. Exam reveals no gallop and no friction rub.   No murmur heard.  Pulmonary/Chest: Effort normal and breath sounds normal. She has no wheezes. She has no rales.   Abdominal: Soft. Bowel sounds are normal. There is no tenderness. There is no rebound and no guarding.   Musculoskeletal: Normal range of motion. She exhibits no edema.   Lymphadenopathy:     She has no cervical adenopathy.   Neurological: She is alert and oriented to person, place, and time. No cranial nerve deficit or sensory deficit.   No focal neurological deficit present.    Skin: Skin is warm. Capillary refill takes less than 2 seconds. No rash noted. No erythema.   Psychiatric: She has a normal mood and affect. Thought content normal.       Assessment:       1. Symptomatic anemia    2. Post-nasal drip    3. Low vitamin D level    4. Uterine leiomyoma,  unspecified location    5. Iron deficiency anemia due to chronic blood loss        Plan:       #Symptomatic anemia  -CBC auto differential; Future; Expected date: 09/21/2018  -CBC auto differential; Future; Expected date: 09/28/2018  - Called and informed the patient of her CBC results as well as informed her to have a repeat CBC in one week prior to her OB/GYN appointment on 9/28/18.  -Likely from the prolapsed fibroid  -Will f/u on OB/GYN recommendations  -Will continue to monitor  -Based on lab studies appear to be iron deficiency and started the patient on oral iron  -Will re check at her next clinic visit    #Post-nasal drip  -levocetirizine (XYZAL) 5 MG tablet; Take 1 tablet (5 mg total) by mouth every evening.  Dispense: 30 tablet; Refill: 11  -Likely the etiology for her cough  -Will re check at her next visit for improvement  -Will continue to monitor    #Low vitamin D level  -ergocalciferol (ERGOCALCIFEROL) 50,000 unit Cap; Take 1 capsule (50,000 Units total) by mouth every 7 days.  Dispense: 4 capsule; Refill: 0  -Low vitamin D level (18)  -Will re check at her next clinic visit  -Continue to monitor    #Uterine leiomyoma, unspecified location  -followed by OB/GYN  -She is going to f/u on biopsy results on 9/28/18  -Likely will schedule hysterectomy per the patient  -Continue to monitor    #Iron deficiency anemia due to chronic blood loss  -ferrous sulfate (FEOSOL) 325 mg (65 mg iron) Tab tablet; Take 1 tablet (325 mg total) by mouth 3 (three) times daily.  Dispense: 88 tablet; Refill: 0  -Called and informed the patient of her CBC results as well as informed her to have a repeat CBC in one week prior to her OB/GYN appointment on 9/28/18.  -Will continue to monitor  -Will re check at her next clinic visit  -Hgb 7.6 from 7.9 (8 days ago), again order another CBC in one week prior to OB/GYN appointment. Will f/u on lab result and inform the patient of the results once they return.       Follow-up in about 6  weeks (around 11/2/2018).

## 2018-09-21 NOTE — TELEPHONE ENCOUNTER
Called patient with no answer. Biopsy was benign so should be able to go forward with hysterectomy. Left a message for her to call us back.

## 2018-09-23 RX ORDER — LEVOCETIRIZINE DIHYDROCHLORIDE 5 MG/1
5 TABLET, FILM COATED ORAL NIGHTLY
Qty: 30 TABLET | Refills: 11 | Status: SHIPPED | OUTPATIENT
Start: 2018-09-23 | End: 2019-04-17

## 2018-09-28 ENCOUNTER — OFFICE VISIT (OUTPATIENT)
Dept: OBSTETRICS AND GYNECOLOGY | Facility: CLINIC | Age: 53
End: 2018-09-28
Payer: MEDICAID

## 2018-09-28 VITALS
SYSTOLIC BLOOD PRESSURE: 134 MMHG | BODY MASS INDEX: 33.51 KG/M2 | WEIGHT: 182.13 LBS | DIASTOLIC BLOOD PRESSURE: 82 MMHG | HEIGHT: 62 IN

## 2018-09-28 DIAGNOSIS — N95.0 PMB (POSTMENOPAUSAL BLEEDING): Primary | ICD-10-CM

## 2018-09-28 DIAGNOSIS — I10 HYPERTENSION, UNSPECIFIED TYPE: ICD-10-CM

## 2018-09-28 DIAGNOSIS — D25.9 UTERINE LEIOMYOMA, UNSPECIFIED LOCATION: ICD-10-CM

## 2018-09-28 DIAGNOSIS — D64.9 SYMPTOMATIC ANEMIA: ICD-10-CM

## 2018-09-28 PROCEDURE — 99999 PR PBB SHADOW E&M-EST. PATIENT-LVL III: CPT | Mod: PBBFAC,,, | Performed by: OBSTETRICS & GYNECOLOGY

## 2018-09-28 PROCEDURE — 99212 OFFICE O/P EST SF 10 MIN: CPT | Mod: S$PBB,,, | Performed by: OBSTETRICS & GYNECOLOGY

## 2018-09-28 PROCEDURE — 99213 OFFICE O/P EST LOW 20 MIN: CPT | Mod: PBBFAC,PO | Performed by: OBSTETRICS & GYNECOLOGY

## 2018-09-28 RX ORDER — MEDROXYPROGESTERONE ACETATE 10 MG/1
10 TABLET ORAL 2 TIMES DAILY
Qty: 40 TABLET | Refills: 0 | Status: SHIPPED | OUTPATIENT
Start: 2018-09-28 | End: 2018-10-18 | Stop reason: SDUPTHER

## 2018-09-28 RX ORDER — MEDROXYPROGESTERONE ACETATE 10 MG/1
10 TABLET ORAL 2 TIMES DAILY
Qty: 40 TABLET | Refills: 0 | Status: SHIPPED | OUTPATIENT
Start: 2018-09-28 | End: 2018-09-28 | Stop reason: SDUPTHER

## 2018-09-28 NOTE — TELEPHONE ENCOUNTER
Pt medication was sent to [facility]. Pt stated she wanted it to go [facility] in [facility]. Medication for provera has been canceled and called in to [facility].

## 2018-09-28 NOTE — TELEPHONE ENCOUNTER
----- Message from Rachael Castro sent at 9/28/2018 10:37 AM CDT -----  Contact: [phone] /self  Patient is requesting a call back regarding medroxyPROGESTERone (PROVERA) 10 MG tablet. Take 1 tablet (10 mg total) by mouth 2 (two) times daily. - Oral was sent to the wrong pharmacy. Please send to [facility] - [facility], [place] - [address] [facility]

## 2018-09-28 NOTE — PROGRESS NOTES
Chief Complaint   Patient presents with    Follow-up       HISTORY OF PRESENT ILLNESS:   Corrine Malik is a 53 y.o. female  who presents  to f/u from PMB.  Patient's last menstrual period was 2018.. Menopause at age 47  She has complains of PMB. She started bleeding like a period on and off in May then a month ago she started having very heavy bleeding on and off about golf ball size. She has had a blood transfusion. Since then she reports the bleeding is light but occasionally will have a day where it is heavier.      No past medical history on file.     No past surgical history on file.      Social History     Socioeconomic History    Marital status: Single     Spouse name: Not on file    Number of children: Not on file    Years of education: Not on file    Highest education level: Not on file   Social Needs    Financial resource strain: Not on file    Food insecurity - worry: Not on file    Food insecurity - inability: Not on file    Transportation needs - medical: Not on file    Transportation needs - non-medical: Not on file   Occupational History    Not on file   Tobacco Use    Smoking status: Never Smoker    Smokeless tobacco: Never Used   Substance and Sexual Activity    Alcohol use: No    Drug use: No    Sexual activity: Not on file   Other Topics Concern    Not on file   Social History Narrative    Not on file       No family history on file.      OB History    Para Term  AB Living   5 5 5         SAB TAB Ectopic Multiple Live Births                  # Outcome Date GA Lbr Armen/2nd Weight Sex Delivery Anes PTL Lv   5 Term            4 Term            3 Term            2 Term            1 Term                   COMPREHENSIVE GYN HISTORY:  PAP History: Denies abnormal Paps, had one last year at [facility] in [place]   Infection History: Denies STDs. Denies PID.  Benign History: Denies uterine fibroids. Denies ovarian cysts. Denies endometriosis Denies other  "conditions.  Cancer History: Denies cervical cancer. Denies uterine cancer or hyperplasia. Denies ovarian cancer. Denies vulvar cancer or pre-cancer. Denies vaginal cancer or pre-cancer. Denies breast cancer. Denies colon cancer.  Cycle: 11/mon2-3 days, no pain, menopause at 47    ROS:  GENERAL: Denies weight gain or weight loss. Feeling well overall.   SKIN: Denies rash or lesions.   HEAD: Denies headache.   NODES: Denies enlarged lymph nodes.   CHEST: Denies shortness of breath.   ABDOMEN: No abdominal pain, constipation, diarrhea, nausea, vomiting or rectal bleeding.   URINARY: No frequency, dysuria, hematuria, or burning on urination.  REPRODUCTIVE: See HPI.   BREASTS: The patient denies pain, lumps, or nipple discharge.       /82   Ht 5' 2" (1.575 m)   Wt 82.6 kg (182 lb 1.6 oz)   LMP 05/04/2018   BMI 33.31 kg/m²     APPEARANCE: Well nourished, well developed, in no acute distress.  NECK: Neck symmetric without  thyromegaly.  NODES: No inguinal, cervical lymph node enlargement.  CHEST: Lungs clear to auscultation.  HEART: Regular rate and rhythm, no murmurs, rubs or gallops.  ABDOMEN: Soft. No tenderness or masses. No hernias. No hepatosplenomegaly.  BREASTS: Symmetrical, no skin changes or visible lesions. No palpable masses, nipple discharge or adenopathy bilaterally.  PELVIC:   VULVA: No lesions. Normal female genitalia.  URETHRAL MEATUS: Normal size and location, no lesions, no prolapse.  URETHRA: No masses, tenderness, prolapse or scarring.  VAGINA: Moist and well rugated, no discharge, no significant cystocele or rectocele.  CERVIX: unable to visualize fully as there was about a 4 cm mass with some vascularity that on bimanual exam appears to be coming through the cervix.   UTERUS: 16 week sized, globular shape, mobile but wide, non-tender, bladder base nontender.  ADNEXA: No masses or tenderness.  PERINEUM: Normal, mo masses    Data Reviewed:     Lipid profile: Date:   Lab Results   Component " Value Date    CHOL 156 09/13/2018     Lab Results   Component Value Date    HDL 38 (L) 09/13/2018     Lab Results   Component Value Date    LDLCALC 102.6 09/13/2018     Lab Results   Component Value Date    TRIG 77 09/13/2018     Lab Results   Component Value Date    CHOLHDL 24.4 09/13/2018     TSH:   Lab Results   Component Value Date    TSH 1.100 09/13/2018     Uterus:  Size: 14.4 x 6.9 x 9.1 cm  Masses: There are numerous uterine fibroids, 3 are measured, one intramural inferiorly measure 5.7 cm.  One intramural near the fundus measures 6.3 cm.  One subserosal posteriorly measures 3.6 cm.  Endometrium: It is not well seen..  The bilateral ovaries were not visualized.  The bilateral adnexa appear normal.    Biopsy: no cancerous cells, cervical tissue, possible fibroid    1. PMB (postmenopausal bleeding)    2. Symptomatic anemia    3. Uterine leiomyoma, unspecified location        A/P 1. Routine gyn annual exam. s/p normal breast exam and MMG ordered.   Lipid Profile, needed every 5 years, up to date. Fasting glucose, needed every 3 years, up to date.   TSH, needed every 5 years, up to date.   Colonoscopy will send referal to [facility] .   2. Elevated BP likely has HTN, f/u with PCP   3. The PMB is likely coming from a prolapsed uterine fibroid which we biopsied today.  Discussed with patient that were not likely to get a pap smear but she reports a normal one last year so will request records. Discussed with her our options would be, if the mass isn't cancer, to remove the mass and see if bleeding resolves or to do a hysterectomy with the understanding that even if the mass is a prolapsed fibroid that we could still be missing endometrial cancer as we wouldn't be able to do sampling. I think at this point the most likely cause of the AUB is the prolapsing mass.  She would like to have a hysterectomy. Will set up hysterectomy. Would like BSO. H/o keloids, will consider steroid injection at time of surgery. Inquired  about abdominoplasty at time of TONI, discussed how that is done and she declines at this point but told her she could meet with plastic surgeon to see and she declines.   4. Anemia requiring 2 U PRBC: should do iron BID and will continue provera

## 2018-10-08 ENCOUNTER — TELEPHONE (OUTPATIENT)
Dept: ADMINISTRATIVE | Facility: OTHER | Age: 53
End: 2018-10-08

## 2018-10-19 RX ORDER — MEDROXYPROGESTERONE ACETATE 10 MG/1
TABLET ORAL
Qty: 40 TABLET | Refills: 0 | Status: SHIPPED | OUTPATIENT
Start: 2018-10-19 | End: 2018-11-19

## 2018-11-05 ENCOUNTER — HOSPITAL ENCOUNTER (OUTPATIENT)
Dept: PREADMISSION TESTING | Facility: HOSPITAL | Age: 53
Discharge: HOME | End: 2018-11-05
Attending: OBSTETRICS & GYNECOLOGY
Payer: MEDICAID

## 2018-11-05 ENCOUNTER — OFFICE VISIT (OUTPATIENT)
Dept: OBSTETRICS AND GYNECOLOGY | Facility: CLINIC | Age: 53
End: 2018-11-05
Payer: MEDICAID

## 2018-11-05 VITALS
SYSTOLIC BLOOD PRESSURE: 160 MMHG | BODY MASS INDEX: 33.13 KG/M2 | WEIGHT: 180 LBS | HEIGHT: 62 IN | DIASTOLIC BLOOD PRESSURE: 102 MMHG

## 2018-11-05 DIAGNOSIS — D64.9 ANEMIA, UNSPECIFIED TYPE: ICD-10-CM

## 2018-11-05 DIAGNOSIS — D21.9 FIBROIDS: Primary | ICD-10-CM

## 2018-11-05 DIAGNOSIS — I10 HYPERTENSION, UNSPECIFIED TYPE: ICD-10-CM

## 2018-11-05 PROCEDURE — 99213 OFFICE O/P EST LOW 20 MIN: CPT | Mod: PBBFAC,PO | Performed by: OBSTETRICS & GYNECOLOGY

## 2018-11-05 PROCEDURE — 99499 UNLISTED E&M SERVICE: CPT | Mod: S$PBB,,, | Performed by: OBSTETRICS & GYNECOLOGY

## 2018-11-05 PROCEDURE — 99999 PR PBB SHADOW E&M-EST. PATIENT-LVL III: CPT | Mod: PBBFAC,,, | Performed by: OBSTETRICS & GYNECOLOGY

## 2018-11-05 NOTE — H&P (VIEW-ONLY)
Diagnosis: PMB, prolapsed fibroid   Planned Procedure: vaginal myomectomy/TONI/BSO  Date of Planned Procedure:18    Cc: I am here for preop for my surgery    HPI: Corrine Malik is a 53 y.o. female with history of PMB causing need for a blood transfusion and she was found to have what appears to be a prolapsing fibroid. She reports she can do 4 METs without getting SOB or CP.     ROS:  GENERAL: Denies weight gain or weight loss. Feeling well overall.   SKIN: Denies rash or lesions.   HEAD: Denies head injury or headache.   CHEST: Denies chest pain or shortness of breath.   CARDIOVASCULAR: Denies palpitations or left sided chest pain.   ABDOMEN: No abdominal pain, constipation, diarrhea, nausea, vomiting or rectal bleeding.   URINARY: No frequency, dysuria, hematuria, or burning on urination.  REPRODUCTIVE: See HPI.   HEMATOLOGIC: No easy bruisability or excessive bleeding.   MUSCULOSKELETAL: Denies joint pain or swelling.   NEUROLOGIC: Denies syncope or weakness.   PSYCHIATRIC: Denies depression, anxiety or mood swings.     PMHx: No past medical history on file.    Surgical hx: No past surgical history on file.    GYNhx: Patient's last menstrual period was 2018.    Obhx:     Review of patient's allergies indicates:   Allergen Reactions    Peas Swelling       MEDS: Reviewed, reconciled      Current Outpatient Medications:     levocetirizine (XYZAL) 5 MG tablet, Take 1 tablet (5 mg total) by mouth every evening., Disp: 30 tablet, Rfl: 11    medroxyPROGESTERone (PROVERA) 10 MG tablet, TAKE 1 TABLET BY MOUTH TWICE DAILY, Disp: 40 tablet, Rfl: 0    Social hx:    Social History     Socioeconomic History    Marital status: Single     Spouse name: Not on file    Number of children: Not on file    Years of education: Not on file    Highest education level: Not on file   Social Needs    Financial resource strain: Not on file    Food insecurity - worry: Not on file    Food insecurity - inability:  Not on file    Transportation needs - medical: Not on file    Transportation needs - non-medical: Not on file   Occupational History    Not on file   Tobacco Use    Smoking status: Never Smoker    Smokeless tobacco: Never Used   Substance and Sexual Activity    Alcohol use: No    Drug use: No    Sexual activity: Not on file   Other Topics Concern    Not on file   Social History Narrative    Not on file       Family hx:  No family history on file.    PE:   Vitals: LMP 05/04/2018   APPEARANCE: Well nourished, well developed, in no acute distress.  SKIN: Normal skin turgor, no lesions.  CHEST: Lungs clear to auscultation.  HEART: Regular rate and rhythm, no murmurs, rubs or gallops.  ABDOMEN: Soft. No tenderness or masses. No hepatosplenomegaly. No hernias.  EXTREMITIES: No clubbing cyanosis or edema.    Imaging:Uterus:Size: 14.4 x 6.9 x 9.1 cm  Masses: There are numerous uterine fibroids, 3 are measured, one intramural inferiorly measure 5.7 cm.  One intramural near the fundus measures 6.3 cm.  One subserosal posteriorly measures 3.6 cm.  The bilateral ovaries were not visualized.  The bilateral adnexa appear normal.    A/P: Corrine Malik is a 53 y.o. female who presents for preop evaluation.      1) Surgery:   -Risks and benefits of surgery discussed with the patient.  She understand we were not able to do a EMB so there is always the chance we are missing endometrial cancer. All questions were answered.  Consents for surgery and blood were signed by the patient today. We discussed ovarian conservation vs BSO and the risks and benefits of each option, including prevention of osteoporosis and cardiac disease with conservation vs the 1/80 lifetime risk of ovarian cancer and possible need for future surgeries for benign ovarian masses if ovaries left in situ. The patient has opted to proceed with BSO.   -Patient has been instructed to be NPO on night prior to procedure. Discussed with her we may need to have  her come in as an outpatient at the end of the week to get a transfusion depending on what her blood counts are today.   -Preop labs ordered: CBC, CMP, T&S, EKG   -postop visit scheduled 1 week after surgery  -BP was elevated today, she had elevated BP during hospital admit but she reports her PCP told her that her BP was normal in the office, will continue to monitor.     Patient to proceed now immediately to preop

## 2018-11-05 NOTE — DISCHARGE INSTRUCTIONS
Your surgery is scheduled for ***.    Please report to Outpatient Surgery Intake Office on the 2nd FLOOR at *** a.m.          INSTRUCTIONS IMPORTANT!!!  ¨ Do not eat or drink after 12 midnight-including water. OK to brush teeth, no   gum, candy or mints!    ¨ Take only these medicines with a small swallow of water-morning of surgery.        ____  Proceed to [facility] on *** for additional blood test.        ____  Do not wear makeup, including mascara.  ____  No powder, lotions or creams to surgical area.  ____  Please remove all jewelry, including piercings and leave at home.  ____  No money or valuables needed. Please leave at home.  ____  Please bring any documents given by your doctor.  ____  If going home the same day, arrange for a ride home. You will not be able to             drive if Anesthesia was used.  ____  Children under 18 years require a parent / guardian present the entire time             they are in surgery / recovery.  ____  Wear loose fitting clothing. Allow for dressings, bandages.  ____  Stop Aspirin, Ibuprofen, Motrin and Aleve at least 3-5 days before surgery, unless otherwise instructed by your doctor, or the nurse.   You MAY use Tylenol/acetaminophen until day of surgery.  ____  Wash the surgical area with Hibiclens the night before surgery, and again the             morning of surgery.  Be sure to rinse hibiclens off completely (if instructed by   nurse).  ____  If you take diabetic medication, do not take am of surgery unless instructed by Doctor.  ____  Call MD for temperature above 101 degrees or any other signs of infection such as Urinary (bladder) infection, Upper respiratory infection, skin boils, etc.  ____ Stop taking any Fish Oil supplement or any Vitamins that contain Vitamin E at least 5 days prior to surgery.  ____ Do Not wear your contact lenses the day of your procedure.  You may wear your glasses.      ____Do not shave for 3 days prior to surgery.  ____  Practice Good hand washing before, during, and after procedure.      I have read or had read and explained to me, and understand the above information.  Additional comments or instructions:  For additional questions call [phone]      Pre-Op Bathing Instructions    Before surgery, you can play an important role in your own health.    Because skin is not sterile, we need to be sure that your skin is as free of germs as possible. By following the instructions below, you can reduce the number of germs on your skin before surgery.    IMPORTANT: You will need to shower with a special soap called Hibiclens*, available at any pharmacy.  If you are allergic to Chlorhexidine (the antiseptic in Hibiclens), use an antibacterial soap such as Dial Soap for your preoperative shower.  You will shower with Hibiclens both the night before your surgery and the morning of your surgery.  Do not use Hibiclens on the head, face or genitals to avoid injury to those areas.    STEP #1: THE NIGHT BEFORE YOUR SURGERY     1. Do not shave the area of your body where your surgery will be performed.  2. Shower and wash your hair and body as usual with your normal soap and shampoo.  3. Rinse your hair and body thoroughly after you shower to remove all soap residue.  4. With your hand, apply one packet of Hibiclens soap to the surgical site.   5. Wash the site gently for five (5) minutes. Do not scrub your skin too hard.   6. Do not wash with your regular soap after Hibiclens is used.  7. Rinse your body thoroughly.  8. Pat yourself dry with a clean, soft towel.  9. Do not use lotion, cream, or powder.  10. Wear clean clothes.    STEP #2: THE MORNING OF YOUR SURGERY     1. Repeat Step #1.    * Not to be used by people allergic to Chlorhexidine.      Hysterectomy: Surgical Procedures  A hysterectomy is the removal of a womans uterus. It can relieve symptoms such as severe pain and bleeding. If you have cancer, it may save your life. You  will discuss what type of surgery you will have with your health care provider. This will depend on your health problem. If you have any questions or concerns, let your health care provider know.  Reaching the uterus  There are several ways to reach the uterus to remove it. The best approach depends on the reason for your surgery. The 3 main approaches are described below:  · Vaginal. An incision is made inside the vagina. The uterus is then removed through this incision. This can be done if the uterus is not too large.  · Laparoscopic. A thin tube with a camera and a light on the end is used. This is called a laparoscope. The doctor makes 2 to 4 small incisions in the abdomen. The scope is put through 1 of the incisions. The scope sends live pictures to a video screen. This allows the doctor see inside the abdomen. Surgical tools are placed through the other small incisions. The uterus can be removed through these incisions or through an incision made in the vagina. One of the following procedures may be done:  ¨ The uterus is removed through a small incision in the vagina. This is called LAVH.  ¨ The uterus is removed through the small incisions in the abdomen. The cervix is left in place. This is called LSH.  ¨ The uterus and cervix are removed through the small incisions in the abdomen. This is called TLH.  ¨ During any of these procedures, robotic technique may be used. This assists the surgeons vision and hand movements.  · Abdominal. One incision of 4 to 6 inches is made in the abdomen. The uterus is removed through this incision.   Types of Hysterectomy     Total hysterectomy       Subtotal hysterectomy       Hysterectomy with salpingo-oophorectomy      When the uterus is removed, the cervix may be left in place. Or it may also be removed. If its removed, the top of the vagina is closed. In certain cases, the ovaries and fallopian tubes are also removed.   · Removing the uterus. During a total  (simple) hysterectomy, the uterus and cervix are removed. During a subtotal hysterectomy, only the uterus is removed. The cervix is left in place. This is also called a supracervical hysterectomy. In either case, the ovaries and fallopian tubes remain. If you have not yet reached menopause, the ovaries will keep making hormones. You may still feel the changes of menstrual cycles. But you will not have periods and cant become pregnant.  · Removing the uterus, ovaries, and tubes. Along with the uterus, the ovaries and fallopian tubes may also be removed. This is called a hysterectomy with salpingo-oophorectomy. It causes the bodys estrogen levels to drop quickly. This is called surgical menopause. Women who have not reached menopause before surgery may have sudden symptoms. But these symptoms can be treated.   Deciding on hysterectomy  You and your doctor can discuss the best options for you. As you decide, your doctor may ask you to think about the following:  · Is your health problem getting in the way of your daily life? Is the problem getting worse? If not, might other treatments be tried first?  · Do you want to have children? If so, other treatments should be considered.  · Should the fallopian tubes be removed too? This will reduce the changes of ovarian cancer since we  now know that many ovarian cancers actually from the the tubes.  · Should the ovaries be removed too? This is often done to treat or prevent cancer. If removal is needed, talk to your doctor about hormone therapy.  Risks and possible complications of a hysterectomy include  · Side effects from the anesthesia  · Infection  · Bleeding, with a possible need for transfusion  · Damage to nearby organs (bladder, bowel, ureters, or nearby nerves or blood vessels)  · Blood clots in the legs or lungs  · Formation of scar tissue that may cause pain or bowel obstruction in the future. This is more common with the abdominal approach.  · Need for second  surgery   Date Last Reviewed: 5/13/2015 © 2000-2017 [employer]. [address] [place] [address], [place] [address]. All rights reserved. This information is not intended as a substitute for professional medical care. Always follow your healthcare professional's instructions.        Anesthesia: General Anesthesia     You are watched continuously during your procedure by your anesthesia provider.     Youre due to have surgery. During surgery, youll be given medicine called anesthesia or anesthetic. This will keep you comfortable and pain-free. Your anesthesia provider will use general anesthesia.  What is general anesthesia?  General anesthesia puts you into a state like deep sleep. It goes into the bloodstream (IV anesthetics), into the lungs (gas anesthetics), or both. You feel nothing during the procedure. You will not remember it. During the procedure, the anesthesia provider monitors you continuously. He or she checks your heart rate and rhythm, blood pressure, breathing, and blood oxygen.  · IV anesthetics. IV anesthetics are given through an IV line in your arm. Theyre often given first. This is so you are asleep before a gas anesthetic is started. Some kinds of IV anesthetics relieve pain. Others relax you. Your doctor will decide which kind is best in your case.  · Gas anesthetics. Gas anesthetics are breathed into the lungs. They are often used to keep you asleep. They can be given through a facemask or a tube placed in your larynx or trachea (breathing tube).  ¨ If you have a facemask, your anesthesia provider will most likely place it over your nose and mouth while youre still awake. Youll breathe oxygen through the mask as your IV anesthetic is started. Gas anesthetic may be added through the mask.  ¨ If you have a tube in the larynx or trachea, it will be inserted into your throat after youre asleep.  Anesthesia tools and medicines  You will likely have:  · IV anesthetics. These are put into  an IV line into your bloodstream.  · Gas anesthetics. You breathe these anesthetics into your lungs, where they pass into your bloodstream.  · Pulse oximeter. This is a small clip that is attached to the end of your finger. This measures your blood oxygen level.  · Electrocardiography leads (electrodes). These are small sticky pads that are placed on your chest. They record your heart rate and rhythm.  · Blood pressure cuff. This reads your blood pressure.  Risks and possible complications  General anesthesia has some risks. These include:  · Breathing problems  · Nausea and vomiting  · Sore throat or hoarseness (usually temporary)  · Allergic reaction to the anesthetic  · Irregular heartbeat (rare)  · Cardiac arrest (rare)   Anesthesia safety  · Follow all instructions you are given for how long not to eat or drink before your procedure.  · Be sure your doctor knows what medicines and drugs you take. This includes over-the-counter medicines, herbs, supplements, alcohol or other drugs. You will be asked when those were last taken.  · Have an adult family member or friend drive you home after the procedure.  · For the first 24 hours after your surgery:  ¨ Do not drive or use heavy equipment.  ¨ Do not make important decisions or sign legal documents. If important decisions or signing legal documents is necessary during the first 24 hours after surgery, have a trusted family member or spouse act on your behalf.  ¨ Avoid alcohol.  ¨ Have a responsible adult stay with you. He or she can watch for problems and help keep you safe.  Date Last Reviewed: 12/1/2016  © 3927-3642 [employer]. [address] [place] [address], [place] [address]. All rights reserved. This information is not intended as a substitute for professional medical care. Always follow your healthcare professional's instructions.

## 2018-11-06 ENCOUNTER — TELEPHONE (OUTPATIENT)
Dept: FAMILY MEDICINE | Facility: HOSPITAL | Age: 53
End: 2018-11-06

## 2018-11-06 ENCOUNTER — HOSPITAL ENCOUNTER (OUTPATIENT)
Dept: PREADMISSION TESTING | Facility: HOSPITAL | Age: 53
Discharge: HOME | End: 2018-11-06
Attending: OBSTETRICS & GYNECOLOGY
Payer: MEDICAID

## 2018-11-06 ENCOUNTER — ANESTHESIA EVENT (OUTPATIENT)
Dept: SURGERY | Facility: HOSPITAL | Age: 53
DRG: 743 | End: 2018-11-06
Payer: MEDICAID

## 2018-11-06 ENCOUNTER — PATIENT MESSAGE (OUTPATIENT)
Dept: OBSTETRICS AND GYNECOLOGY | Facility: HOSPITAL | Age: 53
End: 2018-11-06

## 2018-11-06 ENCOUNTER — CLINICAL SUPPORT (OUTPATIENT)
Dept: LAB | Facility: HOSPITAL | Age: 53
End: 2018-11-06
Attending: OBSTETRICS & GYNECOLOGY
Payer: MEDICAID

## 2018-11-06 DIAGNOSIS — I10 HYPERTENSION, UNSPECIFIED TYPE: Primary | ICD-10-CM

## 2018-11-06 DIAGNOSIS — D64.9 ANEMIA, UNSPECIFIED TYPE: ICD-10-CM

## 2018-11-06 PROCEDURE — 93010 ELECTROCARDIOGRAM REPORT: CPT | Mod: ,,, | Performed by: INTERNAL MEDICINE

## 2018-11-06 PROCEDURE — 93005 ELECTROCARDIOGRAM TRACING: CPT

## 2018-11-06 PROCEDURE — 93010 EKG 12-LEAD: ICD-10-PCS | Mod: ,,, | Performed by: INTERNAL MEDICINE

## 2018-11-06 RX ORDER — SODIUM CHLORIDE, SODIUM LACTATE, POTASSIUM CHLORIDE, CALCIUM CHLORIDE 600; 310; 30; 20 MG/100ML; MG/100ML; MG/100ML; MG/100ML
INJECTION, SOLUTION INTRAVENOUS CONTINUOUS
Status: CANCELLED | OUTPATIENT
Start: 2018-11-06

## 2018-11-06 RX ORDER — LIDOCAINE HYDROCHLORIDE 10 MG/ML
1 INJECTION, SOLUTION EPIDURAL; INFILTRATION; INTRACAUDAL; PERINEURAL ONCE
Status: CANCELLED | OUTPATIENT
Start: 2018-11-06 | End: 2018-11-06

## 2018-11-06 NOTE — TELEPHONE ENCOUNTER
I called and left a voicemail for the patient to return my call to the clinic in order for me to discuss her lab results. I encouraged her to call the clinic for any additional questions that she may have. If I do not hear back from her prior to her next appointment then I will discuss those results with her at that visit.

## 2018-11-06 NOTE — DISCHARGE INSTRUCTIONS
Your surgery is scheduled for 11/12.    Please report to Outpatient Surgery Intake Office on the 2nd FLOOR at 0530 a.m.          INSTRUCTIONS IMPORTANT!!!  ¨ Do not eat or drink after 12 midnight-including water. OK to brush teeth, no   gum, candy or mints!    ¨ Take only these medicines with a small swallow of water-morning of surgery.  None needed        ____  Proceed to [facility] on 11/6 for additional blood test.        ____  Do not wear makeup, including mascara.  ____  No powder, lotions or creams to surgical area.  ____  Please remove all jewelry, including piercings and leave at home.  ____  No money or valuables needed. Please leave at home.  ____  Please bring any documents given by your doctor.  ____  If going home the same day, arrange for a ride home. You will not be able to             drive if Anesthesia was used.  ____  Children under 18 years require a parent / guardian present the entire time             they are in surgery / recovery.  ____  Wear loose fitting clothing. Allow for dressings, bandages.  ____  Stop Aspirin, Ibuprofen, Motrin and Aleve at least 3-5 days before surgery, unless otherwise instructed by your doctor, or the nurse.   You MAY use Tylenol/acetaminophen until day of surgery.  ____  Wash the surgical area with Hibiclens the night before surgery, and again the             morning of surgery.  Be sure to rinse hibiclens off completely (if instructed by   nurse).  ____  If you take diabetic medication, do not take am of surgery unless instructed by Doctor.  ____  Call MD for temperature above 101 degrees or any other signs of infection such as Urinary (bladder) infection, Upper respiratory infection, skin boils, etc.  ____ Stop taking any Fish Oil supplement or any Vitamins that contain Vitamin E at least 5 days prior to surgery.  ____ Do Not wear your contact lenses the day of your procedure.  You may wear your glasses.      ____Do not shave for 3 days prior to  surgery.  ____ Practice Good hand washing before, during, and after procedure.      I have read or had read and explained to me, and understand the above information.  Additional comments or instructions:  For additional questions call [phone]  We will call you 11/9 after 2pm, to verify your time of arrival.  Pre-Op Bathing Instructions    Before surgery, you can play an important role in your own health.    Because skin is not sterile, we need to be sure that your skin is as free of germs as possible. By following the instructions below, you can reduce the number of germs on your skin before surgery.    IMPORTANT: You will need to shower with a special soap called Hibiclens*, available at any pharmacy.  If you are allergic to Chlorhexidine (the antiseptic in Hibiclens), use an antibacterial soap such as Dial Soap for your preoperative shower.  You will shower with Hibiclens both the night before your surgery and the morning of your surgery.  Do not use Hibiclens on the head, face or genitals to avoid injury to those areas.    STEP #1: THE NIGHT BEFORE YOUR SURGERY     1. Do not shave the area of your body where your surgery will be performed.  2. Shower and wash your hair and body as usual with your normal soap and shampoo.  3. Rinse your hair and body thoroughly after you shower to remove all soap residue.  4. With your hand, apply one packet of Hibiclens soap to the surgical site.   5. Wash the site gently for five (5) minutes. Do not scrub your skin too hard.   6. Do not wash with your regular soap after Hibiclens is used.  7. Rinse your body thoroughly.  8. Pat yourself dry with a clean, soft towel.  9. Do not use lotion, cream, or powder.  10. Wear clean clothes.    STEP #2: THE MORNING OF YOUR SURGERY     1. Repeat Step #1.    * Not to be used by people allergic to Chlorhexidine.      Hysterectomy: Surgical Procedures  A hysterectomy is the removal of a womans uterus. It can relieve symptoms such as severe  pain and bleeding. If you have cancer, it may save your life. You will discuss what type of surgery you will have with your health care provider. This will depend on your health problem. If you have any questions or concerns, let your health care provider know.  Reaching the uterus  There are several ways to reach the uterus to remove it. The best approach depends on the reason for your surgery. The 3 main approaches are described below:  · Vaginal. An incision is made inside the vagina. The uterus is then removed through this incision. This can be done if the uterus is not too large.  · Laparoscopic. A thin tube with a camera and a light on the end is used. This is called a laparoscope. The doctor makes 2 to 4 small incisions in the abdomen. The scope is put through 1 of the incisions. The scope sends live pictures to a video screen. This allows the doctor see inside the abdomen. Surgical tools are placed through the other small incisions. The uterus can be removed through these incisions or through an incision made in the vagina. One of the following procedures may be done:  ¨ The uterus is removed through a small incision in the vagina. This is called LAVH.  ¨ The uterus is removed through the small incisions in the abdomen. The cervix is left in place. This is called LSH.  ¨ The uterus and cervix are removed through the small incisions in the abdomen. This is called TLH.  ¨ During any of these procedures, robotic technique may be used. This assists the surgeons vision and hand movements.  · Abdominal. One incision of 4 to 6 inches is made in the abdomen. The uterus is removed through this incision.   Types of Hysterectomy     Total hysterectomy       Subtotal hysterectomy       Hysterectomy with salpingo-oophorectomy      When the uterus is removed, the cervix may be left in place. Or it may also be removed. If its removed, the top of the vagina is closed. In certain cases, the ovaries and fallopian  tubes are also removed.   · Removing the uterus. During a total (simple) hysterectomy, the uterus and cervix are removed. During a subtotal hysterectomy, only the uterus is removed. The cervix is left in place. This is also called a supracervical hysterectomy. In either case, the ovaries and fallopian tubes remain. If you have not yet reached menopause, the ovaries will keep making hormones. You may still feel the changes of menstrual cycles. But you will not have periods and cant become pregnant.  · Removing the uterus, ovaries, and tubes. Along with the uterus, the ovaries and fallopian tubes may also be removed. This is called a hysterectomy with salpingo-oophorectomy. It causes the bodys estrogen levels to drop quickly. This is called surgical menopause. Women who have not reached menopause before surgery may have sudden symptoms. But these symptoms can be treated.   Deciding on hysterectomy  You and your doctor can discuss the best options for you. As you decide, your doctor may ask you to think about the following:  · Is your health problem getting in the way of your daily life? Is the problem getting worse? If not, might other treatments be tried first?  · Do you want to have children? If so, other treatments should be considered.  · Should the fallopian tubes be removed too? This will reduce the changes of ovarian cancer since we  now know that many ovarian cancers actually from the the tubes.  · Should the ovaries be removed too? This is often done to treat or prevent cancer. If removal is needed, talk to your doctor about hormone therapy.  Risks and possible complications of a hysterectomy include  · Side effects from the anesthesia  · Infection  · Bleeding, with a possible need for transfusion  · Damage to nearby organs (bladder, bowel, ureters, or nearby nerves or blood vessels)  · Blood clots in the legs or lungs  · Formation of scar tissue that may cause pain or bowel obstruction in the future.  This is more common with the abdominal approach.  · Need for second surgery   Date Last Reviewed: 5/13/2015  © 7409-4656 [employer]. [address] [place] [address], [place] [address]. All rights reserved. This information is not intended as a substitute for professional medical care. Always follow your healthcare professional's instructions.      Anesthesia: General Anesthesia     You are watched continuously during your procedure by your anesthesia provider.     Youre due to have surgery. During surgery, youll be given medicine called anesthesia or anesthetic. This will keep you comfortable and pain-free. Your anesthesia provider will use general anesthesia.  What is general anesthesia?  General anesthesia puts you into a state like deep sleep. It goes into the bloodstream (IV anesthetics), into the lungs (gas anesthetics), or both. You feel nothing during the procedure. You will not remember it. During the procedure, the anesthesia provider monitors you continuously. He or she checks your heart rate and rhythm, blood pressure, breathing, and blood oxygen.  · IV anesthetics. IV anesthetics are given through an IV line in your arm. Theyre often given first. This is so you are asleep before a gas anesthetic is started. Some kinds of IV anesthetics relieve pain. Others relax you. Your doctor will decide which kind is best in your case.  · Gas anesthetics. Gas anesthetics are breathed into the lungs. They are often used to keep you asleep. They can be given through a facemask or a tube placed in your larynx or trachea (breathing tube).  ¨ If you have a facemask, your anesthesia provider will most likely place it over your nose and mouth while youre still awake. Youll breathe oxygen through the mask as your IV anesthetic is started. Gas anesthetic may be added through the mask.  ¨ If you have a tube in the larynx or trachea, it will be inserted into your throat after youre asleep.  Anesthesia tools and  medicines  You will likely have:  · IV anesthetics. These are put into an IV line into your bloodstream.  · Gas anesthetics. You breathe these anesthetics into your lungs, where they pass into your bloodstream.  · Pulse oximeter. This is a small clip that is attached to the end of your finger. This measures your blood oxygen level.  · Electrocardiography leads (electrodes). These are small sticky pads that are placed on your chest. They record your heart rate and rhythm.  · Blood pressure cuff. This reads your blood pressure.  Risks and possible complications  General anesthesia has some risks. These include:  · Breathing problems  · Nausea and vomiting  · Sore throat or hoarseness (usually temporary)  · Allergic reaction to the anesthetic  · Irregular heartbeat (rare)  · Cardiac arrest (rare)   Anesthesia safety  · Follow all instructions you are given for how long not to eat or drink before your procedure.  · Be sure your doctor knows what medicines and drugs you take. This includes over-the-counter medicines, herbs, supplements, alcohol or other drugs. You will be asked when those were last taken.  · Have an adult family member or friend drive you home after the procedure.  · For the first 24 hours after your surgery:  ¨ Do not drive or use heavy equipment.  ¨ Do not make important decisions or sign legal documents. If important decisions or signing legal documents is necessary during the first 24 hours after surgery, have a trusted family member or spouse act on your behalf.  ¨ Avoid alcohol.  ¨ Have a responsible adult stay with you. He or she can watch for problems and help keep you safe.  Date Last Reviewed: 12/1/2016 © 2000-2017 [employer]. [address] [place] [address], [place] [address]. All rights reserved. This information is not intended as a substitute for professional medical care. Always follow your healthcare professional's instructions.

## 2018-11-06 NOTE — PLAN OF CARE
Allergies, medical, surgical, family and psychosocial histories reviewed with patient. Periop plan of care reviewed. Preop instructions given, including medications to take and to hold. Hibiclens soap and instructions on use given. Time allotted for questions to be addressed.  Patient verbalized understanding.

## 2018-11-06 NOTE — ANESTHESIA PREPROCEDURE EVALUATION
11/06/2018  Corrine Malik is a 53 y.o., female scheduled for total hysterectomy with bilateral salpingo-oopherectomy 11/12/18.    No past medical history on file.    Past Surgical History:   Procedure Laterality Date    tubal ligation      VAGINAL DELIVERY      x 5       Anesthesia Evaluation    I have reviewed the Patient Summary Reports.    I have reviewed the Nursing Notes.   I have reviewed the Medications.     Review of Systems  Anesthesia Hx:  No problems with previous Anesthesia  History of prior surgery of interest to airway management or planning: Previous anesthesia: General  Denies Personal Hx of Anesthesia complications.   Social:  Non-Smoker, No Alcohol Use    Hematology/Oncology:         -- Anemia (requiring blood transfusion 9/2018):   Cardiovascular:   Exercise tolerance: good Denies Dysrhythmias.   Denies Angina.        Pulmonary:   Denies COPD.  Denies Asthma.  Denies Shortness of breath.    Renal/:   Denies Chronic Renal Disease.     Hepatic/GI:   Denies GERD. Denies Liver Disease.    Neurological:   Denies CVA. Denies Seizures.        Physical Exam  General:  Obesity    Airway/Jaw/Neck:  Airway Findings: Mouth Opening: Normal Tongue: Large  General Airway Assessment: Adult  Oropharynx Findings:  Tonsillar Hypertrophy Mallampati: III  Jaw/Neck Findings:  Neck ROM: Normal ROM      Dental:  Dental Findings: In tact   Chest/Lungs:  Chest/Lungs Findings: Clear to auscultation, Normal Respiratory Rate     Heart/Vascular:  Heart Findings: Rate: Normal  Rhythm: Regular Rhythm        Mental Status:  Mental Status Findings:  Cooperative, Alert and Oriented         Anesthesia Plan  Type of Anesthesia, risks & benefits discussed:  Anesthesia Type:  general  Patient's Preference:   Intra-op Monitoring Plan:   Intra-op Monitoring Plan Comments:   Post Op Pain Control Plan:   Post Op Pain  Control Plan Comments:   Induction:   IV  Beta Blocker:         Informed Consent: Patient understands risks and agrees with Anesthesia plan.  Questions answered.   ASA Score: 2     Day of Surgery Review of History & Physical:        Anesthesia Plan Notes: Anesthesia consent to be signed prior to procedure 11/12/18.          Ready For Surgery From Anesthesia Perspective.

## 2018-11-12 ENCOUNTER — ANESTHESIA (OUTPATIENT)
Dept: SURGERY | Facility: HOSPITAL | Age: 53
DRG: 743 | End: 2018-11-12
Payer: MEDICAID

## 2018-11-12 ENCOUNTER — HOSPITAL ENCOUNTER (INPATIENT)
Facility: HOSPITAL | Age: 53
LOS: 2 days | Discharge: HOME | DRG: 743 | End: 2018-11-14
Attending: OBSTETRICS & GYNECOLOGY | Admitting: OBSTETRICS & GYNECOLOGY
Payer: MEDICAID

## 2018-11-12 DIAGNOSIS — Z90.710 STATUS POST TAH-BSO: Primary | ICD-10-CM

## 2018-11-12 DIAGNOSIS — Z90.722 STATUS POST TAH-BSO: Primary | ICD-10-CM

## 2018-11-12 DIAGNOSIS — D21.9 FIBROIDS: ICD-10-CM

## 2018-11-12 DIAGNOSIS — Z90.79 STATUS POST TAH-BSO: Primary | ICD-10-CM

## 2018-11-12 LAB — POCT GLUCOSE: 122 MG/DL (ref 70–110)

## 2018-11-12 PROCEDURE — 88307 TISSUE EXAM BY PATHOLOGIST: CPT | Performed by: PATHOLOGY

## 2018-11-12 PROCEDURE — 0UT20ZZ RESECTION OF BILATERAL OVARIES, OPEN APPROACH: ICD-10-PCS | Performed by: OBSTETRICS & GYNECOLOGY

## 2018-11-12 PROCEDURE — 11000001 HC ACUTE MED/SURG PRIVATE ROOM

## 2018-11-12 PROCEDURE — 71000033 HC RECOVERY, INTIAL HOUR: Performed by: OBSTETRICS & GYNECOLOGY

## 2018-11-12 PROCEDURE — 37000009 HC ANESTHESIA EA ADD 15 MINS: Performed by: OBSTETRICS & GYNECOLOGY

## 2018-11-12 PROCEDURE — 27000221 HC OXYGEN, UP TO 24 HOURS

## 2018-11-12 PROCEDURE — 0UT90ZZ RESECTION OF UTERUS, OPEN APPROACH: ICD-10-PCS | Performed by: OBSTETRICS & GYNECOLOGY

## 2018-11-12 PROCEDURE — 25000003 PHARM REV CODE 250: Performed by: NURSE PRACTITIONER

## 2018-11-12 PROCEDURE — 25000003 PHARM REV CODE 250: Performed by: OBSTETRICS & GYNECOLOGY

## 2018-11-12 PROCEDURE — 63600175 PHARM REV CODE 636 W HCPCS: Performed by: STUDENT IN AN ORGANIZED HEALTH CARE EDUCATION/TRAINING PROGRAM

## 2018-11-12 PROCEDURE — 94799 UNLISTED PULMONARY SVC/PX: CPT

## 2018-11-12 PROCEDURE — 63600175 PHARM REV CODE 636 W HCPCS: Performed by: OBSTETRICS & GYNECOLOGY

## 2018-11-12 PROCEDURE — 94761 N-INVAS EAR/PLS OXIMETRY MLT: CPT

## 2018-11-12 PROCEDURE — 27201423 OPTIME MED/SURG SUP & DEVICES STERILE SUPPLY: Performed by: OBSTETRICS & GYNECOLOGY

## 2018-11-12 PROCEDURE — 0UT70ZZ RESECTION OF BILATERAL FALLOPIAN TUBES, OPEN APPROACH: ICD-10-PCS | Performed by: OBSTETRICS & GYNECOLOGY

## 2018-11-12 PROCEDURE — 71000039 HC RECOVERY, EACH ADD'L HOUR: Performed by: OBSTETRICS & GYNECOLOGY

## 2018-11-12 PROCEDURE — 58150 TOTAL HYSTERECTOMY: CPT | Mod: 80,,, | Performed by: OBSTETRICS & GYNECOLOGY

## 2018-11-12 PROCEDURE — 25000003 PHARM REV CODE 250: Performed by: STUDENT IN AN ORGANIZED HEALTH CARE EDUCATION/TRAINING PROGRAM

## 2018-11-12 PROCEDURE — 58150 TOTAL HYSTERECTOMY: CPT | Mod: ,,, | Performed by: OBSTETRICS & GYNECOLOGY

## 2018-11-12 PROCEDURE — 63600175 PHARM REV CODE 636 W HCPCS: Performed by: ANESTHESIOLOGY

## 2018-11-12 PROCEDURE — 88307 TISSUE EXAM BY PATHOLOGIST: CPT | Mod: 26,,, | Performed by: PATHOLOGY

## 2018-11-12 PROCEDURE — 36000708 HC OR TIME LEV III 1ST 15 MIN: Performed by: OBSTETRICS & GYNECOLOGY

## 2018-11-12 PROCEDURE — 36000709 HC OR TIME LEV III EA ADD 15 MIN: Performed by: OBSTETRICS & GYNECOLOGY

## 2018-11-12 PROCEDURE — 37000008 HC ANESTHESIA 1ST 15 MINUTES: Performed by: OBSTETRICS & GYNECOLOGY

## 2018-11-12 PROCEDURE — 0UBC7ZZ EXCISION OF CERVIX, VIA NATURAL OR ARTIFICIAL OPENING: ICD-10-PCS | Performed by: OBSTETRICS & GYNECOLOGY

## 2018-11-12 RX ORDER — KETOROLAC TROMETHAMINE 30 MG/ML
30 INJECTION, SOLUTION INTRAMUSCULAR; INTRAVENOUS EVERY 6 HOURS
Status: COMPLETED | OUTPATIENT
Start: 2018-11-12 | End: 2018-11-13

## 2018-11-12 RX ORDER — ROCURONIUM BROMIDE 10 MG/ML
INJECTION, SOLUTION INTRAVENOUS
Status: DISCONTINUED | OUTPATIENT
Start: 2018-11-12 | End: 2018-11-12

## 2018-11-12 RX ORDER — HYDROMORPHONE HYDROCHLORIDE 2 MG/ML
0.5 INJECTION, SOLUTION INTRAMUSCULAR; INTRAVENOUS; SUBCUTANEOUS EVERY 5 MIN PRN
Status: DISCONTINUED | OUTPATIENT
Start: 2018-11-12 | End: 2018-11-12 | Stop reason: HOSPADM

## 2018-11-12 RX ORDER — OXYCODONE AND ACETAMINOPHEN 10; 325 MG/1; MG/1
1 TABLET ORAL EVERY 4 HOURS PRN
Status: DISCONTINUED | OUTPATIENT
Start: 2018-11-12 | End: 2018-11-14 | Stop reason: HOSPADM

## 2018-11-12 RX ORDER — SODIUM CHLORIDE 9 MG/ML
INJECTION, SOLUTION INTRAVENOUS CONTINUOUS
Status: DISCONTINUED | OUTPATIENT
Start: 2018-11-12 | End: 2018-11-13

## 2018-11-12 RX ORDER — AMOXICILLIN 250 MG
1 CAPSULE ORAL 2 TIMES DAILY
Status: DISCONTINUED | OUTPATIENT
Start: 2018-11-13 | End: 2018-11-14 | Stop reason: HOSPADM

## 2018-11-12 RX ORDER — ONDANSETRON 8 MG/1
8 TABLET, ORALLY DISINTEGRATING ORAL EVERY 8 HOURS PRN
Status: DISCONTINUED | OUTPATIENT
Start: 2018-11-12 | End: 2018-11-14 | Stop reason: HOSPADM

## 2018-11-12 RX ORDER — BISACODYL 10 MG
10 SUPPOSITORY, RECTAL RECTAL DAILY PRN
Status: DISCONTINUED | OUTPATIENT
Start: 2018-11-12 | End: 2018-11-14 | Stop reason: HOSPADM

## 2018-11-12 RX ORDER — MIDAZOLAM HYDROCHLORIDE 1 MG/ML
INJECTION, SOLUTION INTRAMUSCULAR; INTRAVENOUS
Status: DISCONTINUED | OUTPATIENT
Start: 2018-11-12 | End: 2018-11-12

## 2018-11-12 RX ORDER — LIDOCAINE HYDROCHLORIDE 20 MG/ML
INJECTION, SOLUTION EPIDURAL; INFILTRATION; INTRACAUDAL; PERINEURAL
Status: DISCONTINUED | OUTPATIENT
Start: 2018-11-12 | End: 2018-11-12

## 2018-11-12 RX ORDER — TRIAMCINOLONE ACETONIDE 40 MG/ML
INJECTION, SUSPENSION INTRA-ARTICULAR; INTRAMUSCULAR
Status: DISCONTINUED | OUTPATIENT
Start: 2018-11-12 | End: 2018-11-12 | Stop reason: HOSPADM

## 2018-11-12 RX ORDER — GLYCOPYRROLATE 0.2 MG/ML
INJECTION INTRAMUSCULAR; INTRAVENOUS
Status: DISCONTINUED | OUTPATIENT
Start: 2018-11-12 | End: 2018-11-12

## 2018-11-12 RX ORDER — NEOSTIGMINE METHYLSULFATE 1 MG/ML
INJECTION, SOLUTION INTRAVENOUS
Status: DISCONTINUED | OUTPATIENT
Start: 2018-11-12 | End: 2018-11-12

## 2018-11-12 RX ORDER — ONDANSETRON 2 MG/ML
INJECTION INTRAMUSCULAR; INTRAVENOUS
Status: DISCONTINUED | OUTPATIENT
Start: 2018-11-12 | End: 2018-11-12

## 2018-11-12 RX ORDER — PROPOFOL 10 MG/ML
VIAL (ML) INTRAVENOUS
Status: DISCONTINUED | OUTPATIENT
Start: 2018-11-12 | End: 2018-11-12

## 2018-11-12 RX ORDER — MUPIROCIN 20 MG/G
1 OINTMENT TOPICAL 2 TIMES DAILY
Status: DISCONTINUED | OUTPATIENT
Start: 2018-11-12 | End: 2018-11-14 | Stop reason: HOSPADM

## 2018-11-12 RX ORDER — CEFAZOLIN SODIUM 1 G/50ML
2 SOLUTION INTRAVENOUS
Status: COMPLETED | OUTPATIENT
Start: 2018-11-12 | End: 2018-11-12

## 2018-11-12 RX ORDER — OXYCODONE AND ACETAMINOPHEN 5; 325 MG/1; MG/1
1 TABLET ORAL EVERY 4 HOURS PRN
Status: DISCONTINUED | OUTPATIENT
Start: 2018-11-12 | End: 2018-11-14 | Stop reason: HOSPADM

## 2018-11-12 RX ORDER — ONDANSETRON 2 MG/ML
4 INJECTION INTRAMUSCULAR; INTRAVENOUS DAILY PRN
Status: DISCONTINUED | OUTPATIENT
Start: 2018-11-12 | End: 2018-11-12 | Stop reason: HOSPADM

## 2018-11-12 RX ORDER — SODIUM CHLORIDE 0.9 % (FLUSH) 0.9 %
3 SYRINGE (ML) INJECTION
Status: DISCONTINUED | OUTPATIENT
Start: 2018-11-12 | End: 2018-11-12

## 2018-11-12 RX ORDER — FENTANYL CITRATE 50 UG/ML
INJECTION, SOLUTION INTRAMUSCULAR; INTRAVENOUS
Status: DISCONTINUED | OUTPATIENT
Start: 2018-11-12 | End: 2018-11-12

## 2018-11-12 RX ORDER — MEPERIDINE HYDROCHLORIDE 50 MG/ML
12.5 INJECTION INTRAMUSCULAR; INTRAVENOUS; SUBCUTANEOUS ONCE AS NEEDED
Status: DISCONTINUED | OUTPATIENT
Start: 2018-11-12 | End: 2018-11-12 | Stop reason: HOSPADM

## 2018-11-12 RX ORDER — DIPHENHYDRAMINE HCL 25 MG
25 CAPSULE ORAL EVERY 4 HOURS PRN
Status: DISCONTINUED | OUTPATIENT
Start: 2018-11-12 | End: 2018-11-14 | Stop reason: HOSPADM

## 2018-11-12 RX ORDER — HYDROMORPHONE HYDROCHLORIDE 2 MG/ML
1 INJECTION, SOLUTION INTRAMUSCULAR; INTRAVENOUS; SUBCUTANEOUS EVERY 4 HOURS PRN
Status: DISCONTINUED | OUTPATIENT
Start: 2018-11-12 | End: 2018-11-14 | Stop reason: HOSPADM

## 2018-11-12 RX ORDER — SODIUM CHLORIDE, SODIUM LACTATE, POTASSIUM CHLORIDE, CALCIUM CHLORIDE 600; 310; 30; 20 MG/100ML; MG/100ML; MG/100ML; MG/100ML
INJECTION, SOLUTION INTRAVENOUS CONTINUOUS
Status: DISCONTINUED | OUTPATIENT
Start: 2018-11-12 | End: 2018-11-12

## 2018-11-12 RX ORDER — ACETAMINOPHEN 10 MG/ML
INJECTION, SOLUTION INTRAVENOUS
Status: DISCONTINUED | OUTPATIENT
Start: 2018-11-12 | End: 2018-11-12

## 2018-11-12 RX ORDER — SUCCINYLCHOLINE CHLORIDE 20 MG/ML
INJECTION INTRAMUSCULAR; INTRAVENOUS
Status: DISCONTINUED | OUTPATIENT
Start: 2018-11-12 | End: 2018-11-12

## 2018-11-12 RX ORDER — PHENYLEPHRINE HYDROCHLORIDE 10 MG/ML
INJECTION INTRAVENOUS
Status: DISCONTINUED | OUTPATIENT
Start: 2018-11-12 | End: 2018-11-12

## 2018-11-12 RX ORDER — TRIAMCINOLONE ACETONIDE 40 MG/ML
20 INJECTION, SUSPENSION INTRA-ARTICULAR; INTRAMUSCULAR ONCE
Status: DISCONTINUED | OUTPATIENT
Start: 2018-11-12 | End: 2018-11-12

## 2018-11-12 RX ORDER — POLYETHYLENE GLYCOL 3350 17 G/17G
17 POWDER, FOR SOLUTION ORAL DAILY
Status: DISCONTINUED | OUTPATIENT
Start: 2018-11-13 | End: 2018-11-14 | Stop reason: HOSPADM

## 2018-11-12 RX ORDER — LIDOCAINE HYDROCHLORIDE 10 MG/ML
1 INJECTION, SOLUTION EPIDURAL; INFILTRATION; INTRACAUDAL; PERINEURAL ONCE
Status: DISCONTINUED | OUTPATIENT
Start: 2018-11-12 | End: 2018-11-12

## 2018-11-12 RX ADMIN — MIDAZOLAM 2 MG: 1 INJECTION INTRAMUSCULAR; INTRAVENOUS at 06:11

## 2018-11-12 RX ADMIN — SUCCINYLCHOLINE CHLORIDE 140 MG: 20 INJECTION, SOLUTION INTRAMUSCULAR; INTRAVENOUS at 07:11

## 2018-11-12 RX ADMIN — ONDANSETRON 4 MG: 2 INJECTION, SOLUTION INTRAMUSCULAR; INTRAVENOUS at 08:11

## 2018-11-12 RX ADMIN — KETOROLAC TROMETHAMINE 30 MG: 30 INJECTION, SOLUTION INTRAMUSCULAR at 05:11

## 2018-11-12 RX ADMIN — PROPOFOL 170 MG: 10 INJECTION, EMULSION INTRAVENOUS at 07:11

## 2018-11-12 RX ADMIN — GLYCOPYRROLATE 0.6 MG: 0.2 INJECTION, SOLUTION INTRAMUSCULAR; INTRAVENOUS at 08:11

## 2018-11-12 RX ADMIN — SODIUM CHLORIDE: 0.9 INJECTION, SOLUTION INTRAVENOUS at 01:11

## 2018-11-12 RX ADMIN — OXYCODONE HYDROCHLORIDE AND ACETAMINOPHEN 1 TABLET: 10; 325 TABLET ORAL at 12:11

## 2018-11-12 RX ADMIN — NEOSTIGMINE METHYLSULFATE 5 MG: 1 INJECTION INTRAVENOUS at 08:11

## 2018-11-12 RX ADMIN — ROCURONIUM BROMIDE 5 MG: 10 INJECTION, SOLUTION INTRAVENOUS at 07:11

## 2018-11-12 RX ADMIN — MUPIROCIN 1 G: 20 OINTMENT TOPICAL at 09:11

## 2018-11-12 RX ADMIN — ACETAMINOPHEN 1000 MG: 10 INJECTION, SOLUTION INTRAVENOUS at 08:11

## 2018-11-12 RX ADMIN — SODIUM CHLORIDE, SODIUM LACTATE, POTASSIUM CHLORIDE, AND CALCIUM CHLORIDE: .6; .31; .03; .02 INJECTION, SOLUTION INTRAVENOUS at 07:11

## 2018-11-12 RX ADMIN — FENTANYL CITRATE 25 MCG: 50 INJECTION, SOLUTION INTRAMUSCULAR; INTRAVENOUS at 08:11

## 2018-11-12 RX ADMIN — FENTANYL CITRATE 150 MCG: 50 INJECTION, SOLUTION INTRAMUSCULAR; INTRAVENOUS at 07:11

## 2018-11-12 RX ADMIN — OXYCODONE HYDROCHLORIDE AND ACETAMINOPHEN 1 TABLET: 10; 325 TABLET ORAL at 10:11

## 2018-11-12 RX ADMIN — PHENYLEPHRINE HYDROCHLORIDE 100 MCG: 10 INJECTION INTRAVENOUS at 07:11

## 2018-11-12 RX ADMIN — FENTANYL CITRATE 50 MCG: 50 INJECTION, SOLUTION INTRAMUSCULAR; INTRAVENOUS at 08:11

## 2018-11-12 RX ADMIN — SODIUM CHLORIDE: 0.9 INJECTION, SOLUTION INTRAVENOUS at 09:11

## 2018-11-12 RX ADMIN — HYDROMORPHONE HYDROCHLORIDE 1 MG: 2 INJECTION INTRAMUSCULAR; INTRAVENOUS; SUBCUTANEOUS at 04:11

## 2018-11-12 RX ADMIN — HYDROMORPHONE HYDROCHLORIDE 0.5 MG: 2 INJECTION INTRAMUSCULAR; INTRAVENOUS; SUBCUTANEOUS at 09:11

## 2018-11-12 RX ADMIN — ROCURONIUM BROMIDE 35 MG: 10 INJECTION, SOLUTION INTRAVENOUS at 07:11

## 2018-11-12 RX ADMIN — LIDOCAINE HYDROCHLORIDE 80 MG: 20 INJECTION, SOLUTION EPIDURAL; INFILTRATION; INTRACAUDAL; PERINEURAL at 07:11

## 2018-11-12 RX ADMIN — CEFAZOLIN SODIUM 2 G: 1 SOLUTION INTRAVENOUS at 07:11

## 2018-11-12 NOTE — ANESTHESIA POSTPROCEDURE EVALUATION
"Anesthesia Post Evaluation    Patient: Corrine Malik    Procedure(s) Performed: Procedure(s) (LRB):  HYSTERECTOMY, TOTAL, ABDOMINAL, WITH BILATERAL SALPINGO-OOPHORECTOMY (N/A)  REMOVAL-MASS (VAGINAL) (N/A)    Final Anesthesia Type: general  Patient location during evaluation: PACU  Patient participation: Yes- Able to Participate  Level of consciousness: awake and alert  Post-procedure vital signs: reviewed and stable  Pain management: adequate  Airway patency: patent  PONV status at discharge: No PONV  Anesthetic complications: no      Cardiovascular status: blood pressure returned to baseline  Respiratory status: unassisted  Hydration status: euvolemic  Follow-up not needed.        Visit Vitals  BP (!) 164/86   Pulse 68   Temp 36.7 °C (98 °F) (Skin)   Resp (!) 22   Ht 5' 2" (1.575 m)   Wt 81.6 kg (180 lb)   LMP 05/04/2018   SpO2 99%   BMI 32.92 kg/m²       Pain/Murray Score: Pain Assessment Performed: Yes (11/12/2018  6:02 AM)  Presence of Pain: denies (11/12/2018  9:00 AM)  Murray Score: 8 (11/12/2018  9:00 AM)        "

## 2018-11-12 NOTE — OP NOTE
OPERATIVE REPORT     DATE OF PROCEDURE: 11/12/18    PREOPERATIVE DIAGNOSIS:   1. Symptomatic uterine fibroids 2. PMB 3. Anemia     POSTOPERATIVE DIAGNOSIS:   1. same    PROCEDURE:   1. Total Abdominal hysterectomy   2. Bilateral salpingoophorectomy    SURGEON: Katy Cormier MD    ASSISTANT: Juan Jose Beauchamp MD, Rachael Stearns     ESTIMATED BLOOD LOSS: 125 mL     SPECIMEN: fibroid, uterus, cervix, bilateral tubes and ovaries    OPERATIVE FINDINGS: On EUA, 18 week sized uterus- prolapsing vaginal fibroid     PROCEDURE IN DETAIL:     The patient was taken to the Operating Room where general anesthesia was achieved. Vagina and abdomen was prepped and draped in normal sterile fashion and patient placed in supine position. Pt received ancef 2g IV perioperatively. At time out was performed. Attention was turned vaginally where a a weighed speculum was placed. The fibroid was noted prolapsing through the cervix which was grasped with a tumor tenaculum and the stalk was noted. It was removed with the bovie and handed off the field to be sent to pathology. All instruments were removed from the patient's vagina. Gown and gloves were changed by staff and attention was turned abdominally. Pfannenstiel skin incision was made with scalpel and the incision carried down to the layer of the fascia with the Bovie. The fascial layer was incised with the knife and the incision extended laterally on both sides. The superior edge of the fascia was grasped with [facility] clamps and the underlying rectus muscle dissected off with both blunt and sharp dissection with the Dennis scissors. In a similar fashion the inferior edge of the fascia was grasped with [facility] clamps and the underlying rectus muscle dissected down to the level of the pubic bone. The parietal peritoneum was identified and entered in sharply- no evidence of bowel injury. The incision extended on both sides with blunt traction. Intraabdominal survey was performed. The bowel was  packed with moist laps and the O'Dejuan-Stahl retractor was used to hold the bowel out of view. The uterus was grasped with a jeanne tenaculum. The ureters were identified on both sides. The fallopian tubes and ovaries were identifed and grasped with the ashley bilaterally. The ligasure was used to cauterize and transect the IP ligament and the round ligament. Anteriorly the bladder flap was created with olga and blunt dissection.   Next the uterine arteries were cauterized and transected with ligasure on both sides. The uterus was then amputated and the cervical stump grasped with a Jeanne clamp. We continued to clamp along the cervical stump and suture ligate with 0-vicryl bilaterally down to the uterocervical junction. The cardinal and utererosacral ligaments were identified, clamped, cut, and sutured ligated with transfixing stitch bilaterally. The cervicovaginal junction was palpated and the cervix was clamped bilaterally at that junction. The cervix were then amputated and specimen sent for pathology-permanent. The cuff was then closed with interrupted figure of eight and interrupted sutures 0-vicryl. Irrigation was performed and hemostasis was noted. Anna was applied.     The abdomen was irrigated with saline-hemostasis was noted and the retractor was removed. At this point count was correct x 1. The rectus muscle was reapproximated with 0 Vicryl interrupted. Fascia was closed with two looped 0-Vicryl sutures. The subQ was irrigated and the subQ was closed with 2-0 plain gut in running fashion. The skin was then closed with 4-0 monocryl subcuticular stitches.  Kenalog 20mg diluted in 20 cc of NS was injected into the incision. The patient tolerated the procedure well. Sponge, lap and needle counts were correct x 2. She was taken to Recovery Room in stable condition.

## 2018-11-12 NOTE — INTERVAL H&P NOTE
The patient has been examined and the H&P has been reviewed:    I concur with the findings and no changes have occurred since H&P was written. She still desires to have ovaries removed along with uterus, tubes and cervix. Denies changes to medical history, medications or medical problems.     Anesthesia/Surgery risks, benefits and alternative options discussed and understood by patient/family.          Active Hospital Problems    Diagnosis  POA    Fibroids [D21.9]  Yes      Resolved Hospital Problems   No resolved problems to display.

## 2018-11-12 NOTE — PLAN OF CARE
TN met with pt, pt alert and oriented, pt lives with friend Nick Holloway ([phone]), pt independent with ADLs, pt still drives, No HH/DME noted, pt's friend to provide transportation on d/c.     Pt admitted for surgery for total hysterectomy.    Discharge brochure and blue discharge folder given to pt. TN updated contact information on whiteboard.       11/12/18 1612   Discharge Assessment   Assessment Type Discharge Planning Assessment   Confirmed/corrected address and phone number on facesheet? Yes   Assessment information obtained from? Patient;Medical Record   Expected Length of Stay (days) 2   Communicated expected length of stay with patient/caregiver yes   Prior to hospitilization cognitive status: Alert/Oriented   Prior to hospitalization functional status: Independent   Current cognitive status: Alert/Oriented   Current Functional Status: Independent   Facility Arrived From: home   Lives With friend(s)   Able to Return to Prior Arrangements yes   Is patient able to care for self after discharge? Yes   Who are your caregiver(s) and their phone number(s)? Nick Holloawy ([phone])   Patient's perception of discharge disposition home or selfcare   Readmission Within The Last 30 Days no previous admission in last 30 days   Patient currently being followed by outpatient case management? No   Patient currently receives any other outside agency services? No   Equipment Currently Used at Home none   Do you have any problems affording any of your prescribed medications? No   Is the patient taking medications as prescribed? yes   Does the patient have transportation home? Yes   Transportation Available car;family or friend will provide   Does the patient receive services at the [facility]? No   Discharge Plan A Home;Home with family   Discharge Plan B Home;Home with family;Home Health   Patient/Family In Agreement With Plan yes     Elizabeth Helton RN-BC  Transitional Navigator  [phone]

## 2018-11-12 NOTE — PLAN OF CARE
Patient has met PACU discharge criteria, VSS, pain well controlled. Family updated by phone. Released from PACU by Dr Briones  . *

## 2018-11-12 NOTE — TRANSFER OF CARE
"Anesthesia Transfer of Care Note    Patient: Corrine Malik    Procedure(s) Performed: Procedure(s) (LRB):  HYSTERECTOMY, TOTAL, ABDOMINAL, WITH BILATERAL SALPINGO-OOPHORECTOMY (N/A)  REMOVAL-MASS (VAGINAL) (N/A)    Patient location: PACU    Anesthesia Type: general    Transport from OR: Transported from OR on 6-10 L/min O2 by face mask with adequate spontaneous ventilation    Post pain: adequate analgesia    Post assessment: no apparent anesthetic complications    Post vital signs: stable    Level of consciousness: awake and alert    Nausea/Vomiting: no nausea/vomiting    Complications: none    Transfer of care protocol was followed      Last vitals:   Visit Vitals  BP (!) 180/81 (BP Location: Left arm, Patient Position: Sitting)   Pulse 81   Temp 37.2 °C (99 °F) (Skin)   Resp 12   Ht 5' 2" (1.575 m)   Wt 81.6 kg (180 lb)   LMP 05/04/2018   SpO2 100%   BMI 32.92 kg/m²     "

## 2018-11-13 PROCEDURE — 63600175 PHARM REV CODE 636 W HCPCS: Performed by: OBSTETRICS & GYNECOLOGY

## 2018-11-13 PROCEDURE — 94761 N-INVAS EAR/PLS OXIMETRY MLT: CPT

## 2018-11-13 PROCEDURE — 11000001 HC ACUTE MED/SURG PRIVATE ROOM

## 2018-11-13 PROCEDURE — 94799 UNLISTED PULMONARY SVC/PX: CPT

## 2018-11-13 PROCEDURE — 25000003 PHARM REV CODE 250: Performed by: OBSTETRICS & GYNECOLOGY

## 2018-11-13 RX ADMIN — DOCUSATE SODIUM AND SENNOSIDES 1 TABLET: 8.6; 5 TABLET, FILM COATED ORAL at 09:11

## 2018-11-13 RX ADMIN — IBUPROFEN 600 MG: 200 TABLET, FILM COATED ORAL at 05:11

## 2018-11-13 RX ADMIN — MUPIROCIN 1 G: 20 OINTMENT TOPICAL at 09:11

## 2018-11-13 RX ADMIN — KETOROLAC TROMETHAMINE 30 MG: 30 INJECTION, SOLUTION INTRAMUSCULAR at 05:11

## 2018-11-13 RX ADMIN — SODIUM CHLORIDE: 0.9 INJECTION, SOLUTION INTRAVENOUS at 05:11

## 2018-11-13 RX ADMIN — IBUPROFEN 600 MG: 200 TABLET, FILM COATED ORAL at 12:11

## 2018-11-13 RX ADMIN — KETOROLAC TROMETHAMINE 30 MG: 30 INJECTION, SOLUTION INTRAMUSCULAR at 12:11

## 2018-11-13 RX ADMIN — POLYETHYLENE GLYCOL 3350 17 G: 17 POWDER, FOR SOLUTION ORAL at 09:11

## 2018-11-13 RX ADMIN — OXYCODONE HYDROCHLORIDE AND ACETAMINOPHEN 1 TABLET: 5; 325 TABLET ORAL at 09:11

## 2018-11-13 NOTE — PROGRESS NOTES
.Pharmacy New Medication Education    Patient accepted medication education.    Pharmacy educated patient on name and purpose of medications and possible side effects, using the teach-back method.     Current Inpatient Medication Orders   0.9% NaCl infusion   bisacodyl suppository 10 mg   diphenhydrAMINE capsule 25 mg   hydromorphone (PF) injection 1 mg   ibuprofen tablet 600 mg   ketorolac injection 30 mg   mupirocin 2 % ointment 1 g   ondansetron disintegrating tablet 8 mg   oxyCODONE-acetaminophen  mg per tablet 1 tablet   oxyCODONE-acetaminophen 5-325 mg per tablet 1 tablet   polyethylene glycol packet 17 g   promethazine (PHENERGAN) 12.5 mg in dextrose 5 % 50 mL IVPB   senna-docusate 8.6-50 mg per tablet 1 tablet       Learners of pharmacy medication education included:  Patient    Patient +/- learner response:  Verbalized Understanding, Teachback

## 2018-11-13 NOTE — PLAN OF CARE
Problem: Patient Care Overview  Goal: Plan of Care Review  Outcome: Ongoing (interventions implemented as appropriate)  Plan of care reviewed patient verbalized understanding. Medications administered. Donaldson catheter clean, dry, and intact. aquacel dressing clean, dry, and intact. Incentive spirometer done independently. NS infusing at 125 ml/hr. Bed in the lowest position, call bell within reach, bed alarm on. Will continue to monitor.

## 2018-11-13 NOTE — PROGRESS NOTES
"POD #1 TONI and BSO    Subjective: No complaints. Doing well. Tolerated full diet at breakfast, urinating well, passing flatus, denies BM.  Provided with stool softener this AM.  Ambulating to bathroom    Objective: /74 (BP Location: Left arm, Patient Position: Lying)   Pulse 80   Temp 98.1 °F (36.7 °C) (Oral)   Resp 18   Ht 5' 2" (1.575 m)   Wt 81.6 kg (180 lb)   LMP 05/04/2018 Comment: Fibroids bleeding.  SpO2 96%   Breastfeeding? No   BMI 32.92 kg/m²     I/O last 3 completed shifts:  In: 800 [I.V.:800]  Out: 2805 [Urine:2805]  I/O this shift:  In: 300 [P.O.:300]  Out: 250 [Urine:250]        H/H:   Lab Results   Component Value Date    WBC 8.01 11/06/2018    WBC 8.01 11/06/2018    HGB 11.1 (L) 11/06/2018    HGB 11.1 (L) 11/06/2018    HCT 36.6 (L) 11/06/2018    HCT 36.6 (L) 11/06/2018    MCV 83 11/06/2018    MCV 83 11/06/2018     11/06/2018     11/06/2018       Chest: Clear to auscultation  CV: Regular rate and rhythm  Abdomen: Non-tender, non-distended, soft, positive bowel sounds  Incision: Clean, dry, intact  Extremities: Non-tender, no edema    Assessment:POD #1 TONI and BSO     Plan: Routine progressive care      "

## 2018-11-13 NOTE — PLAN OF CARE
Problem: Patient Care Overview  Goal: Plan of Care Review  Outcome: Ongoing (interventions implemented as appropriate)  Plan of care reviewed with patient. Voices understanding. Pain relief with IV pain medication. Incision site dry, clean, and intact. No acute distress noted at this time. Side rails x3, bed low, call bell within reach. Maintain bed alarm for patient safety. Patient will be monitored overnight.

## 2018-11-13 NOTE — PLAN OF CARE
Problem: Patient Care Overview  Goal: Plan of Care Review  Outcome: Ongoing (interventions implemented as appropriate)  Plan of care reviewed with patient. Voices understanding. IV and fluids discontinued per orders. Patient has urinated since lowe removal this am. Advanced to regular diet with no n/v today. No acute distress noted at this time. Side rails x3, bed low, call bell within reach. Maintain bed alarm for patient safety. Patient will be monitored overnight.

## 2018-11-14 ENCOUNTER — TELEPHONE (OUTPATIENT)
Dept: OBSTETRICS AND GYNECOLOGY | Facility: CLINIC | Age: 53
End: 2018-11-14

## 2018-11-14 VITALS
SYSTOLIC BLOOD PRESSURE: 155 MMHG | TEMPERATURE: 98 F | DIASTOLIC BLOOD PRESSURE: 78 MMHG | RESPIRATION RATE: 18 BRPM | HEIGHT: 62 IN | OXYGEN SATURATION: 98 % | HEART RATE: 120 BPM | WEIGHT: 180 LBS | BODY MASS INDEX: 33.13 KG/M2

## 2018-11-14 PROCEDURE — 94799 UNLISTED PULMONARY SVC/PX: CPT

## 2018-11-14 PROCEDURE — 25000003 PHARM REV CODE 250: Performed by: OBSTETRICS & GYNECOLOGY

## 2018-11-14 PROCEDURE — 94761 N-INVAS EAR/PLS OXIMETRY MLT: CPT

## 2018-11-14 RX ORDER — OXYCODONE AND ACETAMINOPHEN 5; 325 MG/1; MG/1
1 TABLET ORAL EVERY 4 HOURS PRN
Qty: 20 TABLET | Refills: 0 | Status: ON HOLD | OUTPATIENT
Start: 2018-11-14 | End: 2019-01-04 | Stop reason: HOSPADM

## 2018-11-14 RX ORDER — IBUPROFEN 600 MG/1
600 TABLET ORAL EVERY 6 HOURS
Qty: 60 TABLET | Refills: 0 | Status: ON HOLD | COMMUNITY
Start: 2018-11-14 | End: 2019-01-04 | Stop reason: HOSPADM

## 2018-11-14 RX ADMIN — MUPIROCIN 1 G: 20 OINTMENT TOPICAL at 08:11

## 2018-11-14 RX ADMIN — IBUPROFEN 600 MG: 200 TABLET, FILM COATED ORAL at 12:11

## 2018-11-14 RX ADMIN — DOCUSATE SODIUM AND SENNOSIDES 1 TABLET: 8.6; 5 TABLET, FILM COATED ORAL at 08:11

## 2018-11-14 RX ADMIN — POLYETHYLENE GLYCOL 3350 17 G: 17 POWDER, FOR SOLUTION ORAL at 08:11

## 2018-11-14 NOTE — DISCHARGE SUMMARY
Admitting Diagnosis: PMB, prolapsing fibroid  Discharge Diagnosis: s/p TONI/BSO   Procedure: TONI/BSO   Service: OB-GYN, Katy Cormier   Consults: none   Disposition: home  Condition as Discharge: Stable   Hospital Course: Patient was transferred to 4th floor recovery after her procedure.  Her recovery was uncomplicated and by post-op day 2 she was tolerating PO without N/V, ambulating without difficulty, her pain was well controlled with PO pain medications and she had passed flatus. She was stable and ready for discharge.   Medications: OTC ibuprofen, Percocet  Follow up: in 1 week in clinic   Instructions: Keep incision clean and dry, continue to use Is, continue ambulation, take medications as needed and take stool softener as needed, pelvic rest until instructed otherwise by physician  Call or return to ED for fever >100.4, foul smelling vaginal discharge, heavy vaginal bleeding, abdominal pain not responsive to medications or other concerns.

## 2018-11-14 NOTE — PLAN OF CARE
Future Appointments   Date Time Provider Department Center   11/19/2018 10:15 AM Katy Cormier MD [facility]   12/4/2018  3:20 PM [facility]     Discharge rounds on patient. Discussed followup appointments, blue discharge folder, discharge nurse will go over home medications and reasons for medications and final discharge instructions. All patient/caregiver questions answered. Patient verbalized understanding.         11/14/18 1008   Final Note   Assessment Type Final Discharge Note   Anticipated Discharge Disposition Home   Hospital Follow Up  Appt(s) scheduled? Yes   Discharge plans and expectations educations in teach back method with documentation complete? Yes   Right Care Referral Info   Post Acute Recommendation No Care     Sandy uCellar, RN, CCM, CMSRN  RN Transition Navigator  [phone]

## 2018-11-14 NOTE — PLAN OF CARE
Problem: Patient Care Overview  Goal: Plan of Care Review  Outcome: Ongoing (interventions implemented as appropriate)  I have reviewed the plan of care with the pt. The pt complains of abdominal pain and discomfort of a 2-3/10 overnight. Scheduled pain meds and a percocet was administered. The pt is afebrile and VSS. Safety measures are in place.The pt verbalizes full understanding of their plan of care.

## 2018-11-14 NOTE — TELEPHONE ENCOUNTER
----- Message from Liza Manning sent at 11/13/2018  4:30 PM CST -----  Contact: Edilson shi/ case mgmt [phone]  Patient needs to be seen sooner than the next available appointment for a hospital f/u - within 1 week from today. Please call patient and advise.

## 2018-11-14 NOTE — PROGRESS NOTES
.Pharmacy New Medication Education    Patient accepted medication education.    Pharmacy educated patient on name and purpose of medications and possible side effects, using the teach-back method.     Current Inpatient Medication Orders   bisacodyl suppository 10 mg   diphenhydrAMINE capsule 25 mg   hydromorphone (PF) injection 1 mg   ibuprofen tablet 600 mg   mupirocin 2 % ointment 1 g   ondansetron disintegrating tablet 8 mg   oxyCODONE-acetaminophen  mg per tablet 1 tablet   oxyCODONE-acetaminophen 5-325 mg per tablet 1 tablet   polyethylene glycol packet 17 g   promethazine (PHENERGAN) 12.5 mg in dextrose 5 % 50 mL IVPB   senna-docusate 8.6-50 mg per tablet 1 tablet       Learners of pharmacy medication education included:  Patient    Patient +/- learner response:  Verbalized Understanding, Teachback

## 2018-11-14 NOTE — DISCHARGE INSTRUCTIONS
Keep incision clean and dry, continue to use Is, continue ambulation, take medications as needed and take stool softener as needed, pelvic rest until instructed otherwise by physician  Call or return to ED for fever >100.4, foul smelling vaginal discharge, heavy vaginal bleeding, abdominal pain not responsive to medications or other concerns.

## 2018-11-14 NOTE — PLAN OF CARE
Problem: Patient Care Overview  Goal: Plan of Care Review  Outcome: Ongoing (interventions implemented as appropriate)  Pt does IS well.  Ready for self instruct.  Will continue to monitor.

## 2018-11-14 NOTE — PROGRESS NOTES
11/14/2018  Procedure: TONI/BSO  POD: 2    Corrine Malik 53 y.o. s/p TONI/BSO On POD 2 is doing well. Tolerating PO without N/V. Ambulating and urinating without difficulty. Has passed gas but no Bm. No vaginal bleeding. Pain controlled with Po pain medications.     Temp:  [97.3 °F (36.3 °C)-98.7 °F (37.1 °C)]   Pulse:  []   Resp:  [16-18]   BP: (124-161)/(68-90)   SpO2:  [96 %-99 %]         In bed, NAD, RRR, CTA B, abd: S/aTTP/ND + Bs, dressing c/d/i   Ext: no c/c/e    A/P: 53 y.o. s/p TONI/BSOon POD 2  1. Continue routine care, encourage ambulation  2. Discussed discharge planning and if continues to progress will likely be able to be discharged POD 2 or 3.  3. CHTN: stable

## 2018-11-14 NOTE — PLAN OF CARE
Problem: Patient Care Overview  Goal: Plan of Care Review  Outcome: Outcome(s) achieved Date Met: 11/14/18  Discharge instruction and education provided. Patient voices understanding. Patient shows no sign of acute distress. Currently waiting for pharmacy to deliver meds. Colleen at bedside.

## 2018-11-19 ENCOUNTER — OFFICE VISIT (OUTPATIENT)
Dept: OBSTETRICS AND GYNECOLOGY | Facility: CLINIC | Age: 53
End: 2018-11-19
Payer: MEDICAID

## 2018-11-19 VITALS
WEIGHT: 176 LBS | DIASTOLIC BLOOD PRESSURE: 96 MMHG | SYSTOLIC BLOOD PRESSURE: 172 MMHG | HEIGHT: 62 IN | BODY MASS INDEX: 32.39 KG/M2

## 2018-11-19 DIAGNOSIS — Z90.722 STATUS POST TAH-BSO: Primary | ICD-10-CM

## 2018-11-19 DIAGNOSIS — Z90.79 STATUS POST TAH-BSO: Primary | ICD-10-CM

## 2018-11-19 DIAGNOSIS — Z90.710 STATUS POST TAH-BSO: Primary | ICD-10-CM

## 2018-11-19 PROCEDURE — 99999 PR PBB SHADOW E&M-EST. PATIENT-LVL III: CPT | Mod: PBBFAC,,, | Performed by: OBSTETRICS & GYNECOLOGY

## 2018-11-19 PROCEDURE — 99024 POSTOP FOLLOW-UP VISIT: CPT | Mod: ,,, | Performed by: OBSTETRICS & GYNECOLOGY

## 2018-11-19 PROCEDURE — 99213 OFFICE O/P EST LOW 20 MIN: CPT | Mod: PBBFAC,PO | Performed by: OBSTETRICS & GYNECOLOGY

## 2018-11-19 RX ORDER — MUPIROCIN 20 MG/G
OINTMENT TOPICAL 3 TIMES DAILY
Qty: 30 G | Refills: 0 | Status: ON HOLD | OUTPATIENT
Start: 2018-11-19 | End: 2019-01-04 | Stop reason: HOSPADM

## 2018-11-19 NOTE — PROGRESS NOTES
CC: s/p TONI    HPI:   Corrine Malik is a 53 y.o. here for f/u TONI/BSO on 11/12/18. She reports normal bowel movements and urination. Pain is controlled with OTC medications.  Denies vaginal bleeding     Vitals:    11/19/18 1012   BP: (!) 172/96       PHYSICAL EXAM:   APPEARANCE: Well nourished, well developed, in no acute distress.  ABDOMEN: Soft. No tenderness or masses. No hernias. well healed Pfannenstiel incision      Pathology: benign     S/p TONI/BSO      PLAN:   1. Return to clinic in 5 weeks for final exam

## 2018-11-20 NOTE — PHYSICIAN QUERY
PT Name: Corrine Malik  MR #: [id number]    Physician Query Form - Pathology Findings Clarification     CDS/: Linda Markham               Contact information: [email or web address]  This form is a permanent document in the medical record.     Query Date: November 20, 2018      By submitting this query, we are merely seeking further clarification of documentation.  Please utilize your independent clinical judgment when addressing the question(s) below.      The medical record contains the following:     Findings Supporting Clinical Information Location in Medical Record   FINAL PATHOLOGIC DIAGNOSIS:    Uterus and cervix bilateral tubes and ovaries:  -Endometrium: Benign endometrial polyps. Background mucosa with atrophy. No atypia or malignancy  -Myometrium: Benign leiomyomata and adenomyosis  -Cervix: Unremarkable; negative for dysplasia or malignancy  -Ovaries: Benign epithelial inclusions, bilateral. No atypia or malignancy  -Fallopian tubes: No significant histopathologic change    Gross Description:   The serosa is  smooth and gray. The myometrium measures 7.2 cm in maximal thickness. The specimen has multiple  well-circumscribed subserosal, intramural, and submucosal leiomyomas ranging from 0.3 x 0.3 to 7.2 x 5.5 cm.  The leiomyomas are whorled, rubbery and white. No areas of necrosis are identified.                                                         PREOPERATIVE DIAGNOSIS:   1. Symptomatic uterine fibroids 2. PMB 3. Anemia      POSTOPERATIVE DIAGNOSIS:   1. Same    PROCEDURE:   1. Total Abdominal hysterectomy   2. Bilateral salpingoophorectomy Pathology report 11/16                                                      Op note 11/12     Please document the clinical significance of the Pathologists findings of:  1. Endometrium: Benign endometrial polyps  2. Myometrium: Benign leiomyomata and adenomyosis  3. The specimen has multiple well-circumscribed subserosal, intramural, and submucosal leiomyomas  ranging from 0.3 x 0.3 to 7.2 x 5.5 cm.          [  ] I agree with the Pathology Findings        [  ] I do not agree with the Pathology Findings        [  ] Clinically Insignificant        [  ] Clinically Undetermined        [ x ] Other/Clarification of Findings: __how can I agree or not agree with the pathologist? I don't examine the slides or specimen as detailed as they do.This request makes no sense. ____________________________________________    Please document in your progress notes daily for the duration of treatment until resolved and include in your discharge summary.

## 2018-11-21 NOTE — PHYSICIAN QUERY
PT Name: Corrine Malik  MR #: [id number]    Physician Query Form - Pathology Findings Clarification     CDS/: Linda Markham               Contact information: [email or web address]  This form is a permanent document in the medical record.     Query Date: November 21, 2018      By submitting this query, we are merely seeking further clarification of documentation.  Please utilize your independent clinical judgment when addressing the question(s) below.      The medical record contains the following:     Findings Supporting Clinical Information Location in Medical Record   FINAL PATHOLOGIC DIAGNOSIS:    Uterus and cervix bilateral tubes and ovaries:  -Endometrium: Benign endometrial polyps. Background mucosa with atrophy. No atypia or malignancy  -Myometrium: Benign leiomyomata and adenomyosis  -Cervix: Unremarkable; negative for dysplasia or malignancy  -Ovaries: Benign epithelial inclusions, bilateral. No atypia or malignancy  -Fallopian tubes: No significant histopathologic change    Gross Description:   The serosa is  smooth and gray. The myometrium measures 7.2 cm in maximal thickness. The specimen has multiple  well-circumscribed subserosal, intramural, and submucosal leiomyomas ranging from 0.3 x 0.3 to 7.2 x 5.5 cm.  The leiomyomas are whorled, rubbery and white. No areas of necrosis are identified.                                                         PREOPERATIVE DIAGNOSIS:   1. Symptomatic uterine fibroids 2. PMB 3. Anemia      POSTOPERATIVE DIAGNOSIS:   1. Same    PROCEDURE:   1. Total Abdominal hysterectomy   2. Bilateral salpingoophorectomy Pathology report 11/16                                                      Op note 11/12     Please document the clinical significance of the Pathologists findings of:  1. Endometrium: Benign endometrial polyps  2. Myometrium: Benign leiomyomata and adenomyosis  3. The specimen has multiple well-circumscribed subserosal, intramural, and submucosal leiomyomas  ranging from 0.3 x 0.3 to 7.2 x 5.5 cm.          [  ] I agree with the Pathology Findings        [  ] I do not agree with the Pathology Findings        [  ] Clinically Insignificant        [  ] Clinically Undetermined        [ x ] Other/Clarification of Findings:  __________not my pt__________________________________    Please document in your progress notes daily for the duration of treatment until resolved and include in your discharge summary.

## 2018-12-21 ENCOUNTER — OFFICE VISIT (OUTPATIENT)
Dept: OBSTETRICS AND GYNECOLOGY | Facility: CLINIC | Age: 53
End: 2018-12-21
Payer: MEDICAID

## 2018-12-21 VITALS
HEIGHT: 62 IN | DIASTOLIC BLOOD PRESSURE: 96 MMHG | SYSTOLIC BLOOD PRESSURE: 154 MMHG | WEIGHT: 176 LBS | BODY MASS INDEX: 32.39 KG/M2

## 2018-12-21 DIAGNOSIS — Z90.710 STATUS POST TAH-BSO: Primary | ICD-10-CM

## 2018-12-21 DIAGNOSIS — Z90.722 STATUS POST TAH-BSO: Primary | ICD-10-CM

## 2018-12-21 DIAGNOSIS — Z90.79 STATUS POST TAH-BSO: Primary | ICD-10-CM

## 2018-12-21 PROCEDURE — 99999 PR PBB SHADOW E&M-EST. PATIENT-LVL III: CPT | Mod: PBBFAC,,, | Performed by: OBSTETRICS & GYNECOLOGY

## 2018-12-21 PROCEDURE — 99499 UNLISTED E&M SERVICE: CPT | Mod: S$PBB,,, | Performed by: OBSTETRICS & GYNECOLOGY

## 2018-12-21 PROCEDURE — 99213 OFFICE O/P EST LOW 20 MIN: CPT | Mod: PBBFAC,PO | Performed by: OBSTETRICS & GYNECOLOGY

## 2018-12-21 NOTE — PROGRESS NOTES
CC: s/p TONI    HPI:   Corrine Malik is a 53 y.o. here for f/u TONI/BSO on 11/12/18. She reports normal bowel movements and urination. Pain is controlled with OTC medications.  Denies vaginal bleeding. Eating and having normal BM. Urinating well. Reports went to [place] a few days ago and feels like she is getting a cold. Had some non-radiating chest pain 3 days ago but non today.    Vitals:    12/21/18 1349   BP: (!) 154/96   SaO2: 98%  P:       PHYSICAL EXAM:   APPEARANCE: Well nourished, well developed, in no acute distress.  ABDOMEN: Soft. No tenderness or masses. No hernias. well healed Pfannenstiel incision   CTA B  RRR  Normal external genitalia, normal vaginal cuff, well healed with no suture or granulation tissue.         Pathology: benign     S/p TONI/BSO      PLAN:   1. OK to resume normal activities and sexual intercourse ok in 2 weeks.   2. We discussed to call her PCP if cold not improved. We discussed that if she has chest pain or SOB she should always go to ER to have it evaluated.

## 2019-01-03 ENCOUNTER — HOSPITAL ENCOUNTER (OUTPATIENT)
Facility: HOSPITAL | Age: 54
Discharge: HOME | End: 2019-01-04
Attending: EMERGENCY MEDICINE | Admitting: FAMILY MEDICINE
Payer: MEDICAID

## 2019-01-03 DIAGNOSIS — I50.9 CONGESTIVE HEART FAILURE, UNSPECIFIED HF CHRONICITY, UNSPECIFIED HEART FAILURE TYPE: Primary | ICD-10-CM

## 2019-01-03 DIAGNOSIS — I44.7 LEFT BUNDLE BRANCH BLOCK: ICD-10-CM

## 2019-01-03 DIAGNOSIS — I50.9 ACUTE EXACERBATION OF CHF (CONGESTIVE HEART FAILURE): ICD-10-CM

## 2019-01-03 DIAGNOSIS — I10 HYPERTENSION, UNSPECIFIED TYPE: ICD-10-CM

## 2019-01-03 DIAGNOSIS — R07.9 CHEST PAIN: ICD-10-CM

## 2019-01-03 PROBLEM — E87.6 HYPOKALEMIA: Status: ACTIVE | Noted: 2019-01-03

## 2019-01-03 LAB
ALBUMIN SERPL BCP-MCNC: 3.6 G/DL
ALP SERPL-CCNC: 88 U/L
ALT SERPL W/O P-5'-P-CCNC: 15 U/L
ANION GAP SERPL CALC-SCNC: 10 MMOL/L
ANISOCYTOSIS BLD QL SMEAR: ABNORMAL
AST SERPL-CCNC: 18 U/L
BASOPHILS # BLD AUTO: 0.02 K/UL
BASOPHILS NFR BLD: 0.2 %
BILIRUB SERPL-MCNC: 0.5 MG/DL
BNP SERPL-MCNC: 211 PG/ML
BUN SERPL-MCNC: 11 MG/DL
CALCIUM SERPL-MCNC: 9.9 MG/DL
CHLORIDE SERPL-SCNC: 107 MMOL/L
CO2 SERPL-SCNC: 22 MMOL/L
CREAT SERPL-MCNC: 0.8 MG/DL
D DIMER PPP IA.FEU-MCNC: 2.03 MG/L FEU
DIFFERENTIAL METHOD: ABNORMAL
EOSINOPHIL # BLD AUTO: 0.2 K/UL
EOSINOPHIL NFR BLD: 2.2 %
ERYTHROCYTE [DISTWIDTH] IN BLOOD BY AUTOMATED COUNT: 17.9 %
EST. GFR  (AFRICAN AMERICAN): >60 ML/MIN/1.73 M^2
EST. GFR  (NON AFRICAN AMERICAN): >60 ML/MIN/1.73 M^2
ESTIMATED AVG GLUCOSE: 111 MG/DL
FERRITIN SERPL-MCNC: 23 NG/ML
GLUCOSE SERPL-MCNC: 106 MG/DL
HBA1C MFR BLD HPLC: 5.5 %
HCT VFR BLD AUTO: 31.5 %
HGB BLD-MCNC: 9.2 G/DL
HYPOCHROMIA BLD QL SMEAR: ABNORMAL
IRON SERPL-MCNC: 26 UG/DL
LYMPHOCYTES # BLD AUTO: 3 K/UL
LYMPHOCYTES NFR BLD: 30.4 %
MCH RBC QN AUTO: 21.1 PG
MCHC RBC AUTO-ENTMCNC: 29.2 G/DL
MCV RBC AUTO: 72 FL
MONOCYTES # BLD AUTO: 0.5 K/UL
MONOCYTES NFR BLD: 5 %
NEUTROPHILS # BLD AUTO: 6 K/UL
NEUTROPHILS NFR BLD: 62.2 %
PLATELET # BLD AUTO: 344 K/UL
PLATELET BLD QL SMEAR: ABNORMAL
PMV BLD AUTO: 9.9 FL
POIKILOCYTOSIS BLD QL SMEAR: SLIGHT
POLYCHROMASIA BLD QL SMEAR: ABNORMAL
POTASSIUM SERPL-SCNC: 3.4 MMOL/L
PROT SERPL-MCNC: 7.9 G/DL
RBC # BLD AUTO: 4.35 M/UL
SATURATED IRON: 5 %
SODIUM SERPL-SCNC: 139 MMOL/L
TOTAL IRON BINDING CAPACITY: 512 UG/DL
TRANSFERRIN SERPL-MCNC: 346 MG/DL
TROPONIN I SERPL DL<=0.01 NG/ML-MCNC: 0.01 NG/ML
TROPONIN I SERPL DL<=0.01 NG/ML-MCNC: 0.02 NG/ML
WBC # BLD AUTO: 9.73 K/UL

## 2019-01-03 PROCEDURE — 93005 ELECTROCARDIOGRAM TRACING: CPT

## 2019-01-03 PROCEDURE — G0378 HOSPITAL OBSERVATION PER HR: HCPCS

## 2019-01-03 PROCEDURE — 85025 COMPLETE CBC W/AUTO DIFF WBC: CPT

## 2019-01-03 PROCEDURE — 83880 ASSAY OF NATRIURETIC PEPTIDE: CPT

## 2019-01-03 PROCEDURE — 80053 COMPREHEN METABOLIC PANEL: CPT

## 2019-01-03 PROCEDURE — 99285 EMERGENCY DEPT VISIT HI MDM: CPT | Mod: 25

## 2019-01-03 PROCEDURE — 93010 ELECTROCARDIOGRAM REPORT: CPT | Mod: 76,,, | Performed by: INTERNAL MEDICINE

## 2019-01-03 PROCEDURE — 25000003 PHARM REV CODE 250: Performed by: STUDENT IN AN ORGANIZED HEALTH CARE EDUCATION/TRAINING PROGRAM

## 2019-01-03 PROCEDURE — 93010 EKG 12-LEAD: ICD-10-PCS | Mod: 76,,, | Performed by: INTERNAL MEDICINE

## 2019-01-03 PROCEDURE — 25500020 PHARM REV CODE 255: Performed by: EMERGENCY MEDICINE

## 2019-01-03 PROCEDURE — 25000003 PHARM REV CODE 250: Performed by: FAMILY MEDICINE

## 2019-01-03 PROCEDURE — 85379 FIBRIN DEGRADATION QUANT: CPT

## 2019-01-03 PROCEDURE — 36415 COLL VENOUS BLD VENIPUNCTURE: CPT

## 2019-01-03 PROCEDURE — 94640 AIRWAY INHALATION TREATMENT: CPT

## 2019-01-03 PROCEDURE — 63600175 PHARM REV CODE 636 W HCPCS: Performed by: EMERGENCY MEDICINE

## 2019-01-03 PROCEDURE — 25000242 PHARM REV CODE 250 ALT 637 W/ HCPCS: Performed by: EMERGENCY MEDICINE

## 2019-01-03 PROCEDURE — 82728 ASSAY OF FERRITIN: CPT

## 2019-01-03 PROCEDURE — 83036 HEMOGLOBIN GLYCOSYLATED A1C: CPT

## 2019-01-03 PROCEDURE — 25000003 PHARM REV CODE 250: Performed by: EMERGENCY MEDICINE

## 2019-01-03 PROCEDURE — 96374 THER/PROPH/DIAG INJ IV PUSH: CPT | Mod: 59

## 2019-01-03 PROCEDURE — 83540 ASSAY OF IRON: CPT

## 2019-01-03 PROCEDURE — 84484 ASSAY OF TROPONIN QUANT: CPT | Mod: 91

## 2019-01-03 PROCEDURE — 84484 ASSAY OF TROPONIN QUANT: CPT

## 2019-01-03 PROCEDURE — 93010 ELECTROCARDIOGRAM REPORT: CPT | Mod: ,,, | Performed by: INTERNAL MEDICINE

## 2019-01-03 RX ORDER — FUROSEMIDE 10 MG/ML
40 INJECTION INTRAMUSCULAR; INTRAVENOUS 2 TIMES DAILY
Status: DISCONTINUED | OUTPATIENT
Start: 2019-01-04 | End: 2019-01-04 | Stop reason: HOSPADM

## 2019-01-03 RX ORDER — SODIUM CHLORIDE 0.9 % (FLUSH) 0.9 %
5 SYRINGE (ML) INJECTION
Status: DISCONTINUED | OUTPATIENT
Start: 2019-01-03 | End: 2019-01-04 | Stop reason: HOSPADM

## 2019-01-03 RX ORDER — LISINOPRIL 20 MG/1
20 TABLET ORAL DAILY
Status: DISCONTINUED | OUTPATIENT
Start: 2019-01-03 | End: 2019-01-04

## 2019-01-03 RX ORDER — ALPRAZOLAM 0.25 MG/1
0.25 TABLET ORAL
Status: COMPLETED | OUTPATIENT
Start: 2019-01-03 | End: 2019-01-03

## 2019-01-03 RX ORDER — FUROSEMIDE 10 MG/ML
60 INJECTION INTRAMUSCULAR; INTRAVENOUS
Status: COMPLETED | OUTPATIENT
Start: 2019-01-03 | End: 2019-01-03

## 2019-01-03 RX ORDER — IPRATROPIUM BROMIDE AND ALBUTEROL SULFATE 2.5; .5 MG/3ML; MG/3ML
3 SOLUTION RESPIRATORY (INHALATION)
Status: COMPLETED | OUTPATIENT
Start: 2019-01-03 | End: 2019-01-03

## 2019-01-03 RX ORDER — PANTOPRAZOLE SODIUM 40 MG/1
40 TABLET, DELAYED RELEASE ORAL DAILY
Status: DISCONTINUED | OUTPATIENT
Start: 2019-01-04 | End: 2019-01-04 | Stop reason: HOSPADM

## 2019-01-03 RX ORDER — POTASSIUM CHLORIDE 20 MEQ/1
40 TABLET, EXTENDED RELEASE ORAL
Status: COMPLETED | OUTPATIENT
Start: 2019-01-03 | End: 2019-01-03

## 2019-01-03 RX ORDER — ACETAMINOPHEN 325 MG/1
650 TABLET ORAL ONCE
Status: COMPLETED | OUTPATIENT
Start: 2019-01-03 | End: 2019-01-03

## 2019-01-03 RX ADMIN — FUROSEMIDE 60 MG: 10 INJECTION, SOLUTION INTRAMUSCULAR; INTRAVENOUS at 05:01

## 2019-01-03 RX ADMIN — IOHEXOL 100 ML: 350 INJECTION, SOLUTION INTRAVENOUS at 05:01

## 2019-01-03 RX ADMIN — POTASSIUM CHLORIDE 40 MEQ: 20 TABLET, EXTENDED RELEASE ORAL at 05:01

## 2019-01-03 RX ADMIN — ACETAMINOPHEN 650 MG: 325 TABLET ORAL at 10:01

## 2019-01-03 RX ADMIN — LISINOPRIL 20 MG: 10 TABLET ORAL at 09:01

## 2019-01-03 RX ADMIN — IPRATROPIUM BROMIDE AND ALBUTEROL SULFATE 3 ML: .5; 3 SOLUTION RESPIRATORY (INHALATION) at 04:01

## 2019-01-03 RX ADMIN — ALPRAZOLAM 0.25 MG: 0.25 TABLET ORAL at 04:01

## 2019-01-03 NOTE — ED PROVIDER NOTES
"Encounter Date: 1/3/2019    SCRIBE #1 NOTE: I, Anaid Michele, am scribing for, and in the presence of, Dr. García.       History     Chief Complaint   Patient presents with    Shortness of Breath     sob with trouble taking a deep breath since beginning of december. pt had a blood transfusion in November and states this is a similar feeling when her blood levels were low. recent hysterectomy and recent 5 hour flight     Time seen by the provider: 3:11 PM    This is a 53 y.o. female with a PMHx of fibroids and PSHx of total abdominal hysterectomy w/ bilateral salpingoophorectomy who presents to the Emergency Department with a cc of chest pain x 2 weeks. The pain is described as a waxing and waning heaviness that is worsened by lying down. She reports associated shortness of breath (MUNOZ), cough that became productive yesterday, "cold symptoms," and headache. She denies fever, melena, or nausea. Symptoms are worsened by exertion and lying down. She reports no alleviating factors; she has tried Mucinex without relief. Patient reports no prior history of similar symptoms. She denies smoking or drinking. She denies family history of cardiac problems. She has never had a stress test.        The history is provided by the patient.     Review of patient's allergies indicates:   Allergen Reactions    Peas Swelling     No past medical history on file.  Past Surgical History:   Procedure Laterality Date    HYSTERECTOMY, TOTAL, ABDOMINAL, WITH BILATERAL SALPINGO-OOPHORECTOMY N/A 11/12/2018    Performed [facility]    REMOVAL-MASS (VAGINAL) N/A 11/12/2018    [facility]    tubal ligation      VAGINAL DELIVERY      x 5     No family history on file.  Social History     Tobacco Use    Smoking status: Never Smoker    Smokeless tobacco: Never Used   Substance Use Topics    Alcohol use: No    Drug use: No     Review of Systems   Constitutional: Negative for chills, fatigue and " "fever.   HENT: Negative for facial swelling, trouble swallowing and voice change.         Positive for "cod symptoms."   Eyes: Negative for photophobia, pain and redness.   Respiratory: Positive for cough and shortness of breath. Negative for choking.    Cardiovascular: Positive for chest pain. Negative for palpitations and leg swelling.   Gastrointestinal: Negative for abdominal pain, diarrhea, nausea and vomiting.   Genitourinary: Negative for dysuria, frequency and urgency.   Musculoskeletal: Negative for back pain, neck pain and neck stiffness.   Neurological: Positive for headaches. Negative for seizures, speech difficulty, light-headedness and numbness.   All other systems reviewed and are negative.      Physical Exam     Initial Vitals [01/03/19 1502]   BP Pulse Resp Temp SpO2   (!) 168/107 (!) 118 (!) 22 98.6 °F (37 °C) 100 %      MAP       --         Physical Exam    Nursing note and vitals reviewed.  Constitutional: She appears well-developed and well-nourished. No distress.   HENT:   Head: Normocephalic and atraumatic.   Mouth/Throat: Oropharynx is clear and moist.   Eyes: Conjunctivae and EOM are normal. Pupils are equal, round, and reactive to light.   Neck: Normal range of motion. Neck supple.   Cardiovascular: Regular rhythm and normal heart sounds. Tachycardia present.  Exam reveals no gallop and no friction rub.    No murmur heard.  Pulmonary/Chest: Breath sounds normal. She has no wheezes. She has no rhonchi. She has no rales.   Abdominal: Soft. Bowel sounds are normal. She exhibits no distension. There is no tenderness. There is no CVA tenderness and negative Cuevas's sign.   Musculoskeletal: Normal range of motion. She exhibits no edema or tenderness.   Neurological: She is alert and oriented to person, place, and time. She has normal strength. No cranial nerve deficit.   Skin: Skin is warm and dry. Capillary refill takes less than 2 seconds.   Psychiatric: She has a normal mood and affect. Her " behavior is normal.         ED Course   Procedures  Labs Reviewed - No data to display  EKG Readings: (Independently Interpreted)   Rhythm: Sinus Tachycardia. Heart Rate: 106. Ectopy: No Ectopy. Conduction: LBBB. ST Segments: Non-Specific ST Segment Depression. T Waves: Normal. Axis: Normal. Clinical Impression: Sinus Tachycardia       Imaging Results    None          Medical Decision Making:   Clinical Tests:   Lab Tests: Ordered and Reviewed  Radiological Study: Ordered and Reviewed  Medical Tests: Ordered and Reviewed  ED Management:  53-year-old female presents with orthopnea, dyspnea on exertion with persistent right parasternal chest tightness.  She reports similar symptoms during an episode of profound anemia raising suspicion of possible anemia inspired cardiac ischemia.  She has an elevated D-dimer which led to a CT angiogram of the chest which is negative for pulmonary embolism; however, does demonstrate pulmonary edema. She has a left bundle katherine block that was seen during a recent admission and a negative troponin.  She will be admitted for serial enzymes and echocardiogram.  Provocative testing would be considered.  Other:   I have discussed this case with another health care provider.                      Clinical Impression:     1. Chest pain    2. Chest pain               Scribe attestaion:  I, Dr. Curtis García III, personally performed the services described in this documentation. All medical record entries made by the scribe were at my direction and in my presence.  I have reviewed the chart and agree that the record reflects my personal performance and is accurate and complete                    Curtis García III, MD  01/03/19 7286

## 2019-01-03 NOTE — ED TRIAGE NOTES
"Patient presents to ER with complaints of SOB, slight chest pain, and lower abdominal discomfort. Recent full hysterectomy on 11/12/18. Patient received blood transfusion with surgery due to anemia. Patient states "I feel the same way now as I did when my blood counts were low". Reports dyspnea and SOB. Denies leg pain or weakness. EKG obtained and Dr. García shown. Cardiac monitoring in place.   "

## 2019-01-04 VITALS
OXYGEN SATURATION: 97 % | WEIGHT: 175.06 LBS | HEART RATE: 94 BPM | HEIGHT: 62 IN | BODY MASS INDEX: 32.22 KG/M2 | DIASTOLIC BLOOD PRESSURE: 76 MMHG | SYSTOLIC BLOOD PRESSURE: 131 MMHG | TEMPERATURE: 97 F | RESPIRATION RATE: 18 BRPM

## 2019-01-04 LAB
ALBUMIN SERPL BCP-MCNC: 3.6 G/DL
ALP SERPL-CCNC: 84 U/L
ALT SERPL W/O P-5'-P-CCNC: 15 U/L
ANION GAP SERPL CALC-SCNC: 12 MMOL/L
ANISOCYTOSIS BLD QL SMEAR: ABNORMAL
AORTIC ROOT ANNULUS: 2.88 CM
AORTIC VALVE CUSP SEPERATION: 2 CM
AST SERPL-CCNC: 18 U/L
AV INDEX (PROSTH): 0.65
AV MEAN GRADIENT: 5.95 MMHG
AV PEAK GRADIENT: 7.84 MMHG
AV VALVE AREA: 2.09 CM2
BASOPHILS # BLD AUTO: 0.03 K/UL
BASOPHILS NFR BLD: 0.3 %
BILIRUB SERPL-MCNC: 0.5 MG/DL
BSA FOR ECHO PROCEDURE: 1.86 M2
BUN SERPL-MCNC: 16 MG/DL
CALCIUM SERPL-MCNC: 9.7 MG/DL
CHLORIDE SERPL-SCNC: 104 MMOL/L
CO2 SERPL-SCNC: 23 MMOL/L
CREAT SERPL-MCNC: 0.9 MG/DL
CV ECHO LV RWT: 0.24 CM
DIFFERENTIAL METHOD: ABNORMAL
DOP CALC AO PEAK VEL: 1.4 M/S
DOP CALC AO VTI: 20 CM
DOP CALC LVOT AREA: 3.23 CM2
DOP CALC LVOT DIAMETER: 2.03 CM
DOP CALC LVOT STROKE VOLUME: 41.73 CM3
DOP CALCLVOT PEAK VEL VTI: 12.9 CM
ECHO LV POSTERIOR WALL: 0.63 CM (ref 0.6–1.1)
EOSINOPHIL # BLD AUTO: 0.4 K/UL
EOSINOPHIL NFR BLD: 4.1 %
ERYTHROCYTE [DISTWIDTH] IN BLOOD BY AUTOMATED COUNT: 18.2 %
EST. GFR  (AFRICAN AMERICAN): >60 ML/MIN/1.73 M^2
EST. GFR  (NON AFRICAN AMERICAN): >60 ML/MIN/1.73 M^2
FRACTIONAL SHORTENING: 13 % (ref 28–44)
GLUCOSE SERPL-MCNC: 105 MG/DL
HCT VFR BLD AUTO: 31 %
HGB BLD-MCNC: 9 G/DL
HYPOCHROMIA BLD QL SMEAR: ABNORMAL
INTERVENTRICULAR SEPTUM: 0.59 CM (ref 0.6–1.1)
LA MAJOR: 4.37 CM
LA MINOR: 4.83 CM
LA WIDTH: 3.45 CM
LEFT ATRIUM SIZE: 3.07 CM
LEFT ATRIUM VOLUME INDEX: 22.9 ML/M2
LEFT ATRIUM VOLUME: 41.31 CM3
LEFT INTERNAL DIMENSION IN SYSTOLE: 4.58 CM (ref 2.1–4)
LEFT VENTRICLE DIASTOLIC VOLUME INDEX: 73.79 ML/M2
LEFT VENTRICLE DIASTOLIC VOLUME: 133.29 ML
LEFT VENTRICLE MASS INDEX: 58.8 G/M2
LEFT VENTRICLE SYSTOLIC VOLUME INDEX: 53.2 ML/M2
LEFT VENTRICLE SYSTOLIC VOLUME: 96.12 ML
LEFT VENTRICULAR INTERNAL DIMENSION IN DIASTOLE: 5.27 CM (ref 3.5–6)
LEFT VENTRICULAR MASS: 106.26 G
LYMPHOCYTES # BLD AUTO: 3.5 K/UL
LYMPHOCYTES NFR BLD: 36.3 %
MAGNESIUM SERPL-MCNC: 2.1 MG/DL
MCH RBC QN AUTO: 20.9 PG
MCHC RBC AUTO-ENTMCNC: 29 G/DL
MCV RBC AUTO: 72 FL
MONOCYTES # BLD AUTO: 0.6 K/UL
MONOCYTES NFR BLD: 6 %
NEUTROPHILS # BLD AUTO: 5 K/UL
NEUTROPHILS NFR BLD: 53.3 %
PISA TR MAX VEL: 2.15 M/S
PLATELET # BLD AUTO: 353 K/UL
PMV BLD AUTO: 10.3 FL
POLYCHROMASIA BLD QL SMEAR: ABNORMAL
POTASSIUM SERPL-SCNC: 3.3 MMOL/L
PROT SERPL-MCNC: 7.7 G/DL
RA MAJOR: 4.26 CM
RA PRESSURE: 3 MMHG
RBC # BLD AUTO: 4.31 M/UL
RIGHT VENTRICULAR END-DIASTOLIC DIMENSION: 2.68 CM
SODIUM SERPL-SCNC: 139 MMOL/L
TR MAX PG: 18.49 MMHG
TROPONIN I SERPL DL<=0.01 NG/ML-MCNC: 0.01 NG/ML
TV REST PULMONARY ARTERY PRESSURE: 21 MMHG
WBC # BLD AUTO: 9.5 K/UL

## 2019-01-04 PROCEDURE — 84484 ASSAY OF TROPONIN QUANT: CPT

## 2019-01-04 PROCEDURE — G0378 HOSPITAL OBSERVATION PER HR: HCPCS

## 2019-01-04 PROCEDURE — 25000003 PHARM REV CODE 250: Performed by: FAMILY MEDICINE

## 2019-01-04 PROCEDURE — 36415 COLL VENOUS BLD VENIPUNCTURE: CPT

## 2019-01-04 PROCEDURE — 85025 COMPLETE CBC W/AUTO DIFF WBC: CPT

## 2019-01-04 PROCEDURE — 80053 COMPREHEN METABOLIC PANEL: CPT

## 2019-01-04 PROCEDURE — 25000003 PHARM REV CODE 250: Performed by: STUDENT IN AN ORGANIZED HEALTH CARE EDUCATION/TRAINING PROGRAM

## 2019-01-04 PROCEDURE — 63600175 PHARM REV CODE 636 W HCPCS: Performed by: FAMILY MEDICINE

## 2019-01-04 PROCEDURE — 83735 ASSAY OF MAGNESIUM: CPT

## 2019-01-04 PROCEDURE — 94761 N-INVAS EAR/PLS OXIMETRY MLT: CPT

## 2019-01-04 RX ORDER — LISINOPRIL 20 MG/1
20 TABLET ORAL DAILY
Qty: 90 TABLET | Refills: 3 | Status: SHIPPED | OUTPATIENT
Start: 2019-01-04 | End: 2019-01-23 | Stop reason: ALTCHOICE

## 2019-01-04 RX ORDER — CARVEDILOL 3.12 MG/1
3.12 TABLET ORAL 2 TIMES DAILY
Qty: 60 TABLET | Refills: 11 | Status: SHIPPED | OUTPATIENT
Start: 2019-01-04 | End: 2019-04-17

## 2019-01-04 RX ORDER — POTASSIUM CHLORIDE 20 MEQ/1
40 TABLET, EXTENDED RELEASE ORAL ONCE
Status: COMPLETED | OUTPATIENT
Start: 2019-01-04 | End: 2019-01-04

## 2019-01-04 RX ADMIN — FUROSEMIDE 40 MG: 10 INJECTION, SOLUTION INTRAMUSCULAR; INTRAVENOUS at 09:01

## 2019-01-04 RX ADMIN — PANTOPRAZOLE SODIUM 40 MG: 40 TABLET, DELAYED RELEASE ORAL at 09:01

## 2019-01-04 RX ADMIN — FUROSEMIDE 40 MG: 10 INJECTION, SOLUTION INTRAMUSCULAR; INTRAVENOUS at 12:01

## 2019-01-04 RX ADMIN — POTASSIUM CHLORIDE 40 MEQ: 20 TABLET, EXTENDED RELEASE ORAL at 09:01

## 2019-01-04 NOTE — PLAN OF CARE
Problem: Adult Inpatient Plan of Care  Goal: Plan of Care Review  Outcome: Outcome(s) achieved Date Met: 01/04/19  Pt on RA with documented sats.  Will continue to monitor.

## 2019-01-04 NOTE — DISCHARGE SUMMARY
Discharge Summary      Admit Date: 1/3/2019    Discharge Date and Time: 01/04/2019    Attending Physician: Severyn Yaroshevsky, MD     Discharge Physician: Meme Grey    Principal Diagnoses: Acute exacerbation of CHF (congestive heart failure)  The primary encounter diagnosis was Congestive heart failure, unspecified HF chronicity, unspecified heart failure type. Diagnoses of Chest pain, Left bundle branch block, Acute exacerbation of CHF (congestive heart failure), and Hypertension, unspecified type were also pertinent to this visit.    Discharged Condition: good    Hospital Course: Corrine Malik is a 53 y.o. female with pmh  has no past medical history on file. who presented with Acute exacerbation of CHF (congestive heart failure).     Patient is a 53 y.o. female presents with SOB for 10 days that worsens with ambulation. She endorses new-onset 2-pillow orthopnea with associated cough that has become productive of white sputum and pain that is localized at the epigastric region and is reproducible with palpation over the xyphoid process. She denies any LE edema or calf pain. She endorses recent flight to [place] and SOB when walking through the flight terminal. She has not noticed any associations between her symptoms and specific foods. She does not eat spicy foods, nor smoke or drink alcohol. BNP was elevated and pt was admitted for CHF exacerbation. CTA was negative for PE but showed bilateral pleural effusion. Pt was placed on lasix and had strict I&O with low salt diet. TTE showed no acute abnormalities. CBC also showed microcytic anemia and iron studies confirmed JAIME. Pt given iron supplements and H/H remained stable. HTN was noted on multiple occasions and pt was educated about diet and medications for BP control.       Consults: None    Significant Diagnostic Studies: labs: CBC/CMP, troponin, BNP, radiology: X-Ray: CXR: X-Ray Chest 1 View (CXR):   Results for orders placed or performed  during the hospital encounter of 01/03/19   X-Ray Chest 1 View    Narrative    EXAMINATION:  XR CHEST 1 VIEW    CLINICAL HISTORY:  new heart failure;    TECHNIQUE:  Single PA view of the chest    COMPARISON:  Chest radiograph 01/03/2019    FINDINGS:  Cardiomediastinal silhouette is within normal limits.    Pulmonary interstitial edema and trace to small bilateral layering pleural effusions with overlying atelectasis and/or pneumonia.  No pneumothorax..    Multilevel degenerative changes of the imaged spine.      Impression    1.  Pulmonary interstitial edema and trace to small bilateral layering pleural effusions with overlying atelectasis and/or pneumonia.      Electronically signed by: Hans Pierre  Date:    01/04/2019  Time:    08:57     CT scan: CTA chest and cardiac graphics: Echocardiogram:   Transthoracic echo (TTE) complete (Cupid Only):   Results for orders placed or performed during the hospital encounter of 01/03/19   Transthoracic echo (TTE) complete (Cupid Only)   Result Value Ref Range    BSA 1.86 m2    LA WIDTH 3.45 cm    AORTIC VALVE CUSP SEPERATION 2.00 cm    LVIDD 5.27 3.5 - 6.0 cm    IVS 0.59 (A) 0.6 - 1.1 cm    PW 0.63 0.6 - 1.1 cm    Ao root annulus 2.88 cm    LVIDS 4.58 (A) 2.1 - 4.0 cm    FS 13 28 - 44 %    LA volume 41.31 cm3    LV mass 106.26 g    LA size 3.07 cm    RVDD 2.68 cm    Left Ventricle Relative Wall Thickness 0.24 cm    AV mean gradient 5.95 mmHg    AV valve area 1.89 cm2    AV index (prosthetic) 0.58     LVOT diameter 2.03 cm    LVOT area 3.23 cm2    LVOT peak VTI 12.90 cm    Ao peak heebrt 1.40 m/s    Ao VTI 22.06 cm    LVOT stroke volume 41.73 cm3    AV peak gradient 7.84 mmHg    TR Max Hebert 2.15 m/s    LV Systolic Volume 96.12 mL    LV Systolic Volume Index 53.2 mL/m2    LV Diastolic Volume 133.29 mL    LV Diastolic Volume Index 73.79 mL/m2    LA Volume Index 22.9 mL/m2    LV Mass Index 58.8 g/m2    RA Major Axis 4.26 cm    Left Atrium Minor Axis 4.83 cm    Left Atrium Major Axis  4.37 cm    Triscuspid Valve Regurgitation Peak Gradient 18.49 mmHg       Treatments: cardiac meds: furosemide    Disposition: Home or Self Care    Patient Instructions:   Current Discharge Medication List      CONTINUE these medications which have NOT CHANGED    Details   levocetirizine (XYZAL) 5 MG tablet Take 1 tablet (5 mg total) by mouth every evening.  Qty: 30 tablet, Refills: 11    Associated Diagnoses: Post-nasal drip         STOP taking these medications       ibuprofen (ADVIL,MOTRIN) 600 MG tablet Comments:   Reason for Stopping:         mupirocin (BACTROBAN) 2 % ointment Comments:   Reason for Stopping:         oxyCODONE-acetaminophen (PERCOCET) 5-325 mg per tablet Comments:   Reason for Stopping:               Discharge Procedure Orders   EKG 12-lead   Standing Status: Future Standing Exp. Date: 11/05/19     Order Specific Question Answer Comments   Diagnosis Pre-operative cardiovascular examination [V72.81.ICD-9-CM]      > 30 minutes spent with patient at time of discharge    Meme Grey  01/04/2019  11:58 AM

## 2019-01-04 NOTE — PLAN OF CARE
Pt AAOx4  Pt independent with ADLs, No HH or DME noted.  No D/c needs noted.    Future Appointments   Date Time Provider Department Center   1/9/2019  1:20 PM Omari Garland DO [facility]          01/04/19 1213   Discharge Assessment   Assessment Type Discharge Planning Assessment   Confirmed/corrected address and phone number on facesheet? Yes   Assessment information obtained from? Patient;Medical Record   Communicated expected length of stay with patient/caregiver yes   Prior to hospitilization cognitive status: Alert/Oriented   Prior to hospitalization functional status: Independent   Current cognitive status: Alert/Oriented   Current Functional Status: Independent   Lives With spouse   Able to Return to Prior Arrangements yes   Is patient able to care for self after discharge? Yes   Patient's perception of discharge disposition home or selfcare   Readmission Within the Last 30 Days no previous admission in last 30 days   Patient currently being followed by outpatient case management? No   Patient currently receives any other outside agency services? No   Equipment Currently Used at Home none   Do you have any problems affording any of your prescribed medications? No   Is the patient taking medications as prescribed? yes   Does the patient have transportation home? Yes   Transportation Anticipated family or friend will provide   Does the patient receive services at the [facility]? No   Discharge Plan A Home;Home with family   Patient/Family in Agreement with Plan yes   Elizabeth Helton RN-BC  Transitional Navigator  [phone]

## 2019-01-04 NOTE — PLAN OF CARE
Discharge order noted, NO HH or DME.    Discharge rounds on patient. Discussed followup appointments, blue discharge folder, discharge nurse will go over home medications and reasons for medications and final discharge instructions. All patient/caregiver questions answered. Patient verbalized understanding.         01/04/19 1217   Final Note   Assessment Type Final Discharge Note   Anticipated Discharge Disposition Home   What phone number can be called within the next 1-3 days to see how you are doing after discharge? [phone]   Hospital Follow Up  Appt(s) scheduled? Yes   Right Care Referral Info   Post Acute Recommendation No Care   Elizabeth Helton RN-BC  Transitional Navigator  [phone]

## 2019-01-04 NOTE — ED NOTES
Introduced self to patient. Patient is sitting in bed, awake a&ox4, no apparent distress noted, calm and cooperative. Patient aware of POC, instructed on use of call light. VSS.

## 2019-01-04 NOTE — PLAN OF CARE
Problem: Adult Inpatient Plan of Care  Goal: Plan of Care Review  Outcome: Ongoing (interventions implemented as appropriate)  Patient stable VS over the night, afebrile. No episodes of chest pain over the night, adequate urine output noted post lasix dose. Telemetry no ectopy noted. Free from fall. Will endorse patient to day shift Nurse.

## 2019-01-04 NOTE — NURSING
Admitted 53year old female patient from ED, brought in to the unit around [id number] via wheelchair. Conscious , coherent to time, place date, and situation, ambulatory, with saline lock on the right AC gauge 20 , flushed patent, with clean and dry dressing.  Patient case of pulmonary edema. Oxygen saturation of 95-96% on room air,afebrile, stable VS, no chest pain no difficulty of breathing upon arrival to the unit, but complaining of mild headache 3/10, relieved with tylenol.  Dr. Armas informed regarding this admission. Telemetry Sinus tachycardic, left bundle brunch block, inverted t-wave, HR (100-105's). Oxygen saturation 97% on room air.SCD applied. Advised patient to call for any assistance. Safety fall precaution measures noted, Bed alarm ON. Call bell with in reach. Vaccines not updated but refused to receive vaccines. Will continue to monitor patient.

## 2019-01-04 NOTE — PROGRESS NOTES
Progress Note  [facility]  Admit Date: 1/3/2019   LOS: 0 days   SUBJECTIVE:     Follow-up For: New onset heart failure    AF. NAEON.  Lasix given overnight with appropriate output response.  HR improved.  ECHO this AM.  Tolerating reg diet.      OBJECTIVE:   Vital Signs (Most Recent)  Temp: 97.2 °F (36.2 °C) (01/04/19 0758)  Pulse: 89 (01/04/19 0800)  Resp: 20 (01/04/19 0758)  BP: 126/74 (01/04/19 0758)  SpO2: 97 % (01/04/19 0322)    I & O (Last 24H):    Intake/Output Summary (Last 24 hours) at 1/4/2019 0933  Last data filed at 1/4/2019 0850  Gross per 24 hour   Intake 1070 ml   Output 1000 ml   Net 70 ml     Wt Readings from Last 3 Encounters:   01/04/19 79.4 kg (175 lb 0.7 oz)   12/21/18 79.8 kg (176 lb)   11/19/18 79.8 kg (176 lb)       Current Diet Order   Procedures    Diet Low Sodium, 2gm Fluid - 1000mL     Order Specific Question:   Fluid restriction:     Answer:   Fluid - 1000mL        Physical Exam  Gen: NAD, sitting up in bed, speaking in full sentences  HEENT: NC, AT  Chest: CTAB, no wheezing  CVS: RRR, no m/r/g  Abdomen: soft, NT, ND, no masses, +BS  Ext: no edema, pulses 2+   Skin: ro rashes  Neuro: A&O x 3, CN's grossly intact, sensation intact to light touch  Psych: Normal mood, affect, thought content        Laboratory Data:  CBC  Recent Labs   Lab 01/03/19  1551 01/04/19  0432   WBC 9.73 9.50   RBC 4.35 4.31   HGB 9.2* 9.0*   HCT 31.5* 31.0*    353*   MCV 72* 72*   MCH 21.1* 20.9*   MCHC 29.2* 29.0*     CMP  Recent Labs   Lab 01/03/19  1551 01/04/19  0432   CALCIUM 9.9 9.7   PROT 7.9 7.7    139   K 3.4* 3.3*   CO2 22* 23    104   BUN 11 16   CREATININE 0.8 0.9   ALKPHOS 88 84   ALT 15 15   AST 18 18   BILITOT 0.5 0.5     POCT-Glucose  No results found for: POCTGLUCOSE  COAGS  No results for input(s): PT, INR, APTT in the last 168 hours.  UA  No results for input(s): COLORU, CLARITYU, SPECGRAV, PHUR, PROTEINUA, GLUCOSEU, BLOODU, WBCU, RBCU, BACTERIA, MUCUS in the last 24  hours.    Invalid input(s):  BILIRUBINCON  MICRO  Microbiology Results (last 7 days)     ** No results found for the last 168 hours. **        LIPID PANEL  Lab Results   Component Value Date    CHOL 156 09/13/2018     Lab Results   Component Value Date    HDL 38 (L) 09/13/2018     Lab Results   Component Value Date    LDLCALC 102.6 09/13/2018     Lab Results   Component Value Date    TRIG 77 09/13/2018     Lab Results   Component Value Date    CHOLHDL 24.4 09/13/2018       Diagnostic Results:  Imaging in last 24 hours:    CTA 1/3/18: No evidence of pulmonary embolism.     Moderate bilateral pleural effusions.     Small amount of ground-glass attenuation scattered throughout the lungs which may be seen with pulmonary edema.     Possible subcarinal lymph node, slightly enlarged which is nonspecific.  Differential consideration includes a vessel which is non-opacified.       CXR 1/3/18: Findings suggesting pulmonary edema and small right pleural effusion.  Interstitial pneumonia not excluded.         CXR this AM pending    ASSESSMENT/PLAN:   Corrine Malik is a 53 y.o. female    Patient is a 53 y.o. Female presented with SOB for 10 days, admitted for CHF exacerbation.     CHF exacerbation  BNP elevated at 211  CTA neg for PE but with moderate bilateral pleural effusions  Clinically with bilateral rales on exam, together with history, labs and imaging all consistent with CHF exacerbation.   Also with undiagnosed HTN per chart review.  Continue lasix 40 mg IV BID while in-patient  Daily weights, strict I&O's, fluid restrict and low Na diet  2D Echo pending this AM     HTN  BP elevated per chart review on many occasions and never addressed.  Would explain her CHF  Started ACE-i this admission, will discharge with it  Holding BB's and CCB's in setting of acute decomp HF  Monitor BP  Can use hydralazine IV PRN     Hypokalemia  K of 3.4 in ED -> 3.3 this AM  Expected needing to replace additional K loses 2/2  lasix     Iron deficiency anemia  Hb 9.2 -> 9.0, last on file was 11.1 two months ago  Iron studies showing JAIME, MCV is chronically low 70's  Started iron supplements, will discharge with supplements and colace  Just had hysterectomy in Nov 2018  H/H stable        Code: full  Diet: fluid restrict, low Na  Ppx: protonix, SCD's     Dispo: pending echo, likely discharge        1/4/2019 Reed Tipton MD  6:38 AM

## 2019-01-04 NOTE — DISCHARGE INSTRUCTIONS
Hypertension, Established (English) View Edit Remove   Carvedilol tablets (English) View Edit Remove   Lisinopril tablets (English) View Edit Remove   Heart Failure, Discharge Instructions for (English) View Edit Remove   Heart Failure: Warning Signs of a Flare-Up (English) View Edit Remove

## 2019-01-04 NOTE — H&P
History & Physical  [facility]      SUBJECTIVE:     History of Present Illness:  Patient is a 53 y.o. female presents with SOB for 10 days. Worse with ambulation. She endorses new-onset 2-pillow orthopnea. Also has associated cough that has become productive of white sputum and pain that is localized at the epigastric region and is reproducible with palpation over the xyphoid process. She denies any LE edema or calf pain. She endorses recent flight to [place] and SOB when walking through the flight terminal. She has not noticed any associations between her symptoms and specific foods. She does not eat spicy foods, nor smoke or drink alcohol.       Review of patient's allergies indicates:   Allergen Reactions    Peas Swelling       No past medical history on file.  Past Surgical History:   Procedure Laterality Date    HYSTERECTOMY, TOTAL, ABDOMINAL, WITH BILATERAL SALPINGO-OOPHORECTOMY N/A 11/12/2018    Performed [facility]    REMOVAL-MASS (VAGINAL) N/A 11/12/2018    Performed by [facility] at [facility] OR    tubal ligation      VAGINAL DELIVERY      x 5     No family history on file.  Social History     Tobacco Use    Smoking status: Never Smoker    Smokeless tobacco: Never Used   Substance Use Topics    Alcohol use: No    Drug use: No        Review of Systems:  POS: SOB, CP, cough, orthopnea, fatigue  NEG: nausea, vomiting, HA      OBJECTIVE:     Vital Signs (Most Recent)  Temp: 98.6 °F (37 °C) (01/03/19 1502)  Pulse: (!) 111 (01/03/19 1739)  Resp: (!) 34 (01/03/19 1739)  BP: (!) 165/90 (01/03/19 1739)  SpO2: 98 % (01/03/19 1739)    Physical Exam:  Gen: NAD, sitting up in bed, speaking in full sentences  HEENT: NC, AT  Chest: bibasilar rales, no wheezing  CVS: RRR, no m/r/g  Abdomen: soft, NT, ND, no masses, +BS  Ext: trace edema bilaterally, pulses 2+   Skin: ro rashes  Neuro: A&O x 3, CN's grossly intact, sensation intact to light touch  Psych: Normal mood, affect,  thought content    LABS  CBC  Recent Labs   Lab 01/03/19  1551   WBC 9.73   RBC 4.35   HGB 9.2*   HCT 31.5*      MCV 72*   MCH 21.1*   MCHC 29.2*     BMP  Recent Labs   Lab 01/03/19  1551      K 3.4*   CO2 22*      BUN 11   CREATININE 0.8       Recent Labs   Lab 01/03/19  1551   CALCIUM 9.9     LFT  Recent Labs   Lab 01/03/19  1551   PROT 7.9   ALBUMIN 3.6   BILITOT 0.5   AST 18   ALKPHOS 88   ALT 15     CE  Recent Labs   Lab 01/03/19  1551   TROPONINI 0.010     BNP  Recent Labs   Lab 01/03/19  1752   *     LAST HbA1c  Lab Results   Component Value Date    HGBA1C 5.2 09/13/2018       Imaging Results          CTA Chest Non-Coronary (Final result)  Result time 01/03/19 17:17:40    Final result by Nishant Mcginnis MD (01/03/19 17:17:40)                 Impression:      No evidence of pulmonary embolism.    Moderate bilateral pleural effusions.    Small amount of ground-glass attenuation scattered throughout the lungs which may be seen with pulmonary edema.    Possible subcarinal lymph node, slightly enlarged which is nonspecific.  Differential consideration includes a vessel which is non-opacified.      Electronically signed by: Nishant Mcginnis MD  Date:    01/03/2019  Time:    17:17             Narrative:    EXAMINATION:  CTA CHEST NON CORONARY    CLINICAL HISTORY:  Chest pain, acute, PE suspected, intermed prob, positive D-dimer;    TECHNIQUE:  Low dose axial images, sagittal and coronal reformations were obtained from the thoracic inlet to the lung bases following the IV administration of 100 mL of Omnipaque 350.  Contrast timing was optimized to evaluate the pulmonary arteries.  MIP images were performed.    COMPARISON:  None    FINDINGS:  Study is adequate for the evaluation of pulmonary thromboembolism.  No evidence of pulmonary thromboembolism to the level of the subsegmental arteries bilaterally.    No thyroid nodules.  Thoracic aorta is normal in caliber and contour.  No  cardiomegaly.  No pericardial effusion.  Possible enlarged subcarinal lymph node measuring 1.7 (series 2, image 26).  No evidence of axillary lymphadenopathy.  No hilar lymphadenopathy.    Trachea and bronchi are patent.  Moderate bilateral pleural effusions with atelectasis of the lower lobes bilaterally.  Small amount of ground-glass attenuation which may be seen with mild pulmonary edema.    Limited evaluation of the upper abdomen demonstrates no significant abnormality.    Osseous structures demonstrate no evidence of acute fracture.                               X-Ray Chest PA And Lateral (Final result)  Result time 01/03/19 16:24:51    Final result by Lewis Salazar MD (01/03/19 16:24:51)                 Impression:      Findings suggesting pulmonary edema and small right pleural effusion.  Interstitial pneumonia not excluded.      Electronically signed by: Lewis Salazar MD  Date:    01/03/2019  Time:    16:24             Narrative:    EXAMINATION:  XR CHEST PA AND LATERAL    CLINICAL HISTORY:  Chest pain, unspecified    TECHNIQUE:  PA and lateral views of the chest were performed.    COMPARISON:  None    FINDINGS:  Cardiomediastinal silhouette is similar to prior.  Interval increased bilateral diffuse nonspecific interstitial coarsening with peribronchial cuffing suggesting pulmonary edema, with interstitial pneumonia not excluded.  Interval small right pleural effusion.  No large consolidation or pneumothorax.  No acute osseous process seen.                                ASSESSMENT/PLAN:   Patient is a 53 y.o. Female presented with SOB for 10 days, admitted for CHF exacerbation.    CHF exacerbation  BNP elevated at 211  CTA neg for PE but with moderate bilateral pleural effusions  Clinically with bilateral rales on exam, together with history, labs and imaging all consistent with CHF exacerbation.   Also with undiagnosed HTN per chart review.  Continue lasix 40 mg IV BID   Daily weights, strict I&O's, fluid  restrict and low Na diet  2D Echo ordered    HTN  BP elevated per chart review on many occasions and never addressed.  Would explain her CHF  Will start ACE-i tonight  Holding BB's and CCB's in setting of acute decomp HF  Monitor BP  Can use hydralazine IV PRN    Hypokalemia  K of 3 in ER, given 40 meq PO at that time  Will repeat CMP in AM and anticipate needing to replace additional K loses 2/2 lasix    Iron deficiency anemia  Hb 9.2, last on file was 11.1 two months ago  Iron studies showing JAIME, MCV is chronically low 70's  Will start iron supplements  Just had hysterectomy in Nov 2018  F/u H&H in the morning      Code: full  Diet: fluid restrict, low Na  Ppx: protonix, SCD's    Dispo: follow up her I&O's, continue lasix, monitor K, trend troponins    1/3/2019 Dhiraj Holcomb M.D.

## 2019-01-09 ENCOUNTER — OFFICE VISIT (OUTPATIENT)
Dept: FAMILY MEDICINE | Facility: HOSPITAL | Age: 54
End: 2019-01-09
Attending: FAMILY MEDICINE
Payer: MEDICAID

## 2019-01-09 VITALS
HEIGHT: 62 IN | HEART RATE: 94 BPM | DIASTOLIC BLOOD PRESSURE: 83 MMHG | WEIGHT: 176.38 LBS | SYSTOLIC BLOOD PRESSURE: 133 MMHG | BODY MASS INDEX: 32.46 KG/M2

## 2019-01-09 DIAGNOSIS — Z12.11 COLON CANCER SCREENING: ICD-10-CM

## 2019-01-09 DIAGNOSIS — Z11.59 NEED FOR HEPATITIS C SCREENING TEST: ICD-10-CM

## 2019-01-09 DIAGNOSIS — I10 ESSENTIAL HYPERTENSION: ICD-10-CM

## 2019-01-09 DIAGNOSIS — I50.23 ACUTE ON CHRONIC SYSTOLIC CONGESTIVE HEART FAILURE: Primary | ICD-10-CM

## 2019-01-09 PROCEDURE — 99214 OFFICE O/P EST MOD 30 MIN: CPT | Performed by: STUDENT IN AN ORGANIZED HEALTH CARE EDUCATION/TRAINING PROGRAM

## 2019-01-09 NOTE — PROGRESS NOTES
Subjective:       Patient ID: Corrine Malik is a 53 y.o. female.    Chief Complaint: Hospital Follow Up    HPI   Mrs. Corrine Malik is a 54 yo F w/ pmhx of prolapsed uterine fibroid s/p TONI-BSO, HTN, and recent HFrEF who presents to the clinic for hospital follow-up. Since her discharge she endorses that she is not yet back to her baseline. She endorses that she has been compliant with her medication. Her BP was 133/83. She is asymptomatic currently which includes but not limited to: SOB, CP, light headed, visual changes, pallor, or heart palpitations. She also denies any fever, chills, nausea, constipation, diarrhea, no urinary symptoms or HA. Her recent echo from 1/4/19 revealed an EF of 15%.    Review of Systems    Constitutional: Negative for chills, diaphoresis, fatigue, fever and unexpected weight change.   Eyes: Negative for photophobia, pain and redness.   Respiratory: Positive for shortness of breath. Negative for chest tightness, wheezing and stridor.    Cardiovascular: Negative for chest pain and palpitations.   Gastrointestinal: Negative for abdominal pain, constipation, diarrhea, nausea and vomiting.   Endocrine: Negative for heat intolerance, polyphagia and polyuria.   Genitourinary: Negative for dysuria, flank pain, frequency, hematuria and urgency.   Musculoskeletal: Negative for arthralgias, joint swelling and myalgias.   Skin: Negative for color change, pallor and rash.   Allergic/Immunologic: Negative for environmental allergies and food allergies.   Neurological: Negative for dizziness, tremors, seizures, syncope, weakness, light-headedness and headaches.   Hematological: Negative for adenopathy. Does not bruise/bleed easily.   Psychiatric/Behavioral: Negative for agitation and behavioral problems. The patient is not nervous/anxious.      Objective:      Vitals:    01/09/19 1425   BP: 133/83   Pulse: 94     Physical Exam    Constitutional: She is oriented to person, place, and time. She  appears well-developed and well-nourished. She is in no acute distress.   HENT:   Head: Normocephalic and atraumatic.   Eyes: EOM are normal. Pupils are equal, round, and reactive to light. No scleral icterus.   Neck: Normal range of motion. Neck supple.   Cardiovascular: Normal rate, regular rhythm,  and intact distal pulses. No extremity edema noted.   Pulmonary/Chest: Effort normal and breath sounds normal. She has no wheezes. She has no rales.    Abdominal: Soft. Bowel sounds are normal. There is no tenderness. There is no rebound and no guarding.   Musculoskeletal: Normal range of motion. She exhibits no edema.   Neurological: She is alert and oriented to person, place, and time. No cranial nerve deficit or sensory deficit.   No focal neurological deficit present.    Skin: Skin is warm. Capillary refill takes less than 2 seconds. No rash noted. No erythema.   Psychiatric: She has a normal mood and affect. Thought content normal.         Assessment:       1. Acute on chronic systolic congestive heart failure    2. Essential hypertension                Plan:       #Acute on chronic systolic congestive heart failure  -     Ambulatory referral to Cardiology for medical co-management  - Recent ECHO on 1/4/19 w/ EF 15%  - Encouraged to call the clinic for any additional questions or concerns. Also, encouraged to return to the clinic if her symptoms do not improve or worsen.   - Check your weight every day. This is very important because a sudden increase in weight gain could mean worsening heart failure. Keep these things in mind:Use the same scale every day.Weigh yourself at the same time every day.Make sure the scale is on a hard floor surface, not on a rug or carpet. Keep a record of your weight every day so your healthcare provider can see it. If you are not given a log sheet for this, keep a separate journal for this purpose. Cut back on the amount of salt (sodium) you eat. Follow your healthcare provider's  recommendation on how much salt or sodium you should have each day. Avoid high-salt foods. These include olives, pickles, smoked meats, salted potato chips, and most prepared foods. Don't add salt to your food at the table. Use only small amounts of salt when cooking. Read the labels carefully on food packages to learn how much salt or sodium is in each serving in the package. Remember, a can or package of food may contain more than 1 serving. So if you eat all the food in the package, you may be getting more salt than you think. Follow your healthcare provider's recommendations about how much fluid you should have. Be aware that some foods, such as soup, pudding, and juicy fruits like oranges or melons, contain liquid. You'll need to count the liquid in those foods as part of your daily fluid intake. Cut back on how much alcohol you drink. Lose weight if you are overweight. The excess weight adds a lot of stress on the workload of the heart. Stay active.  Keep your feet elevated to reduce swelling.     #Essential hypertension   - Continue medical management    #Health Maintenance  -     Hepatitis C antibody; Future; Expected date: 01/09/2019  -     Ambulatory referral to Gastroenterology for colon CA screening        Follow-up in about 1 month (around 2/9/2019).

## 2019-01-10 NOTE — PATIENT INSTRUCTIONS
Left- or Right- Side Congestive Heart Failure (CHF)    The heart is a large muscle. It is a pump that circulates blood throughout the body. Blood carries oxygen to all of the organs, including the brain, muscles, and skin. After your body takes the oxygen out of the blood, the blood returns to the heart. The right side of the heart collects the blood from the body and pumps it to the lungs. In the lungs, it gets fresh oxygen and gives up carbon dioxide. The oxygen-rich blood from the lungs then returns to the left side of the heart, where it is pumped back out to the rest of your body, starting the process all over.  Congestive heart failure (CHF) occurs when the heart muscle is weakened. This affects the pumping action of the heart. Heart failure can affect the right side of the heart or the left side. But heart failure may affect not only the right side of the heart or only the left side. Although it may have started on one side, it can and often eventually does affect both sides.  Right-side heart failure  When the right side of the heart is weakened, it cant handle the blood it is getting from the rest of the body. This blood returns to the heart through veins. When too much pressure builds up in the veins, fluid leaks out into the tissues. Gravity then causes that fluid to move to those parts of the body that are the lowest. So one of the first symptoms of right-side CHF can include swelling in the feet and ankles. If the condition gets worse, the swelling can even go up past the knees. Sometimes it gets so severe, the liver can get congested as well.  Left-side heart failure  When the left side of the heart is weakened, it cant handle the blood it gets from the lungs. Pressure then builds up in the veins of the lungs, causing fluid to leak into the lung tissues. This may cause CHF and pulmonary edema. This causes you to feel short of breath, weak, or dizzy. These symptoms are often worse with exertion,  such as when climbing stairs or walking up hills. Lying with your head flat is uncomfortable and can make your breathing worse. This may make sleeping difficult. You may need to use extra pillows to elevate your upper body to sleep well. The same is true when just resting during the daytime.  There are many causes of heart failure including:  · Coronary artery disease  · Past heart attack (also known as acute myocardial infarction, or AMI)  · High blood pressure  · Damaged heart valve  · Diabetes  · Obesity  · Cigarette smoking  · Alcohol abuse  Heart failure is a chronic condition. There is no cure. The purpose of medical treatment is to improve the pumping action of the heart. The main way to do this is to remove excess water from the body. A number of medicines can help reach this goal, improve symptoms, and prevent the heart from becoming weaker. Sometimes, heart failure can become so severe that a device is placed in the heart to help with pumping. Another major goal is to better treat the causes of heart failure, such as diabetes and high blood pressure, by making changes in your lifestyle and maximizing medical control when needed.  Home care  Follow these guidelines when caring for yourself at home:  · Check your weight every day. This is very important because a sudden increase in weight gain could mean worsening heart failure. Keep these things in mind:  ¨ Use the same scale every day.  ¨ Weigh yourself at the same time every day.  ¨ Make sure the scale is on a hard floor surface, not on a rug or carpet.  ¨ Keep a record of your weight every day so your healthcare provider can see it. If you are not given a log sheet for this, keep a separate journal for this purpose.   · Cut back on the amount of salt (sodium) you eat. Follow your healthcare provider's recommendation on how much salt or sodium you should have each day.  ¨ Avoid high-salt foods. These include olives, pickles, smoked meats, salted potato  chips, and most prepared foods.  ¨ Don't add salt to your food at the table. Use only small amounts of salt when cooking.  ¨ Read the labels carefully on food packages to learn how much salt or sodium is in each serving in the package. Remember, a can or package of food may contain more than 1 serving. So if you eat all the food in the package, you may be getting more salt than you think.  · Follow your healthcare provider's recommendations about how much fluid you should have. Be aware that some foods, such as soup, pudding, and juicy fruits like oranges or melons, contain liquid. You'll need to count the liquid in those foods as part of your daily fluid intake. Your provider can help you with this.  · Stop smoking.  · Cut back on how much alcohol you drink.  · Lose weight if you are overweight. The excess weight adds a lot of stress on the workload of the heart.  · Stay active. Talk with your provider about an exercise program that is safe for your heart.  · Keep your feet elevated to reduce swelling. Ask your provider about support hose as a preventive treatment for daytime leg swelling.  Besides taking your medicine as instructed, an important part of treatment is lifestyle changes. These include diet, physical activity, stopping smoking, and weight control.  Improve your diet by including more fresh foods, cutting back on how much sugar and saturated fat you eat, and eating fewer processed foods and less salt.  Follow-up care  Follow up with your healthcare provider, or as advised.  Make sure to keep any appointments that were made for you. These can help better control your congestive heart failure. You will need to follow up with your provider on a routine basis to make sure your heart failure is well managed.  If an X-ray, ECG, or other tests were done, you will be told of any new findings that may affect your care.  Call 911  Call 911 if you:  · Become severely short of breath  · Feel lightheaded, or feel  like you might pass out or faint  · Have chest pain or discomfort that is different than usual, the medicines your doctor told you to use for this don't help, or the pain lasts longer than 10 to 15 minutes  · You suddenly develop a rapid heart rate  When to seek medical advice  The following may be signs that your heart failure is getting worse. Call your healthcare provider right away if any of these happen:  · Sudden weight gain. This means 3 or more pounds in one day, or 5 or more pounds in 1 week.  · Trouble breathing not related to being active  · New or increased swelling of your legs or ankles  · Swelling or pain in your abdomen  · Breathing trouble at night. This means waking up short of breath or needing more pillows to breathe.  · Frequent coughing that doesnt go away  · Feeling much more tired than usual  Date Last Reviewed: 1/4/2016  © 9361-4364 [employer]. [address] [place] [address], [place] [address]. All rights reserved. This information is not intended as a substitute for professional medical care. Always follow your healthcare professional's instructions.

## 2019-01-10 NOTE — PROGRESS NOTES
I have reviewed the notes, assessments, and/or procedures performed, I concur with her/his documentation of Corrine Malik.

## 2019-01-23 ENCOUNTER — OFFICE VISIT (OUTPATIENT)
Dept: CARDIOLOGY | Facility: CLINIC | Age: 54
End: 2019-01-23
Payer: MEDICAID

## 2019-01-23 VITALS
HEART RATE: 100 BPM | DIASTOLIC BLOOD PRESSURE: 90 MMHG | OXYGEN SATURATION: 98 % | WEIGHT: 177.63 LBS | SYSTOLIC BLOOD PRESSURE: 134 MMHG | HEIGHT: 62 IN | BODY MASS INDEX: 32.69 KG/M2

## 2019-01-23 DIAGNOSIS — I42.0 CARDIOMYOPATHY, DILATED: Primary | ICD-10-CM

## 2019-01-23 DIAGNOSIS — I10 ESSENTIAL HYPERTENSION: ICD-10-CM

## 2019-01-23 DIAGNOSIS — I50.23 ACUTE ON CHRONIC SYSTOLIC CONGESTIVE HEART FAILURE: ICD-10-CM

## 2019-01-23 PROCEDURE — 99205 OFFICE O/P NEW HI 60 MIN: CPT | Mod: S$PBB,,, | Performed by: INTERNAL MEDICINE

## 2019-01-23 PROCEDURE — 99213 OFFICE O/P EST LOW 20 MIN: CPT | Mod: PBBFAC,PN | Performed by: INTERNAL MEDICINE

## 2019-01-23 PROCEDURE — 99999 PR PBB SHADOW E&M-EST. PATIENT-LVL III: ICD-10-PCS | Mod: PBBFAC,,, | Performed by: INTERNAL MEDICINE

## 2019-01-23 PROCEDURE — 99205 PR OFFICE/OUTPT VISIT, NEW, LEVL V, 60-74 MIN: ICD-10-PCS | Mod: S$PBB,,, | Performed by: INTERNAL MEDICINE

## 2019-01-23 PROCEDURE — 99999 PR PBB SHADOW E&M-EST. PATIENT-LVL III: CPT | Mod: PBBFAC,,, | Performed by: INTERNAL MEDICINE

## 2019-01-23 RX ORDER — CANDESARTAN 16 MG/1
16 TABLET ORAL DAILY
Qty: 90 TABLET | Refills: 3 | Status: SHIPPED | OUTPATIENT
Start: 2019-01-23 | End: 2019-01-25 | Stop reason: ALTCHOICE

## 2019-01-23 RX ORDER — SODIUM CHLORIDE 0.9 % (FLUSH) 0.9 %
5 SYRINGE (ML) INJECTION
Status: CANCELLED | OUTPATIENT
Start: 2019-01-31

## 2019-01-23 RX ORDER — DIAZEPAM 2 MG/1
10 TABLET ORAL EVERY 6 HOURS PRN
Status: DISCONTINUED | OUTPATIENT
Start: 2019-01-23 | End: 2019-01-30 | Stop reason: CLARIF

## 2019-01-23 RX ORDER — FUROSEMIDE 40 MG/1
40 TABLET ORAL DAILY
Qty: 90 TABLET | Refills: 3 | Status: ON HOLD | OUTPATIENT
Start: 2019-01-23 | End: 2019-01-31 | Stop reason: SDUPTHER

## 2019-01-23 RX ORDER — SODIUM CHLORIDE 9 MG/ML
INJECTION, SOLUTION INTRAVENOUS ONCE
Status: CANCELLED | OUTPATIENT
Start: 2019-01-31

## 2019-01-23 NOTE — PROGRESS NOTES
"[facility]    Chief Complaint   Patient presents with    Shortness of Breath     on exertion    Congestive Heart Failure     ref by Dr Omari Burgos       Patient ID: Corrine Malik is a 53 y.o. female with a past medical history of fibroids s/p total abdominal hysterectomy w/ bilateral salpingoophorectomy, who presents for an initial appointment.  Pertinent history/events are as follows:     -On 1/3/2019, pt presented to [facility] with SOB x 10 days.  Found to have new onset chf.  Echo from 1/4/2019 showed   · Severely decreased left ventricular systolic function. The estimated ejection fraction is 15%  · Left ventricular diastolic dysfunction.  · Moderate left ventricular enlargement.  · Normal right ventricular systolic function.  · The estimated PA systolic pressure is 21 mm Hg  ·   EKG from 1/3/2019 showed Sinus tachycardia with Left bundle branch block (present on EKG's since 9/13/2018).  Pt discharged on carvedilol 3.125 mg bid and lisinopril 20 mg daily.      HPI:  Mrs. Malik reports SOB on exertion since hospital discharge.  States sometimes when she eats, her stomach "swells".  Complains of severe cough since starting lisinopril.  She has no chest pain or LE edema.  Blood pressure today is 134/90.      No past medical history on file.  Past Surgical History:   Procedure Laterality Date    HYSTERECTOMY, TOTAL, ABDOMINAL, WITH BILATERAL SALPINGO-OOPHORECTOMY N/A 11/12/2018    Performed by [facility]ier [facility] at [facility]    REMOVAL-MASS (VAGINAL) N/A 11/12/2018    [facility] at [facility] OR    tubal ligation      VAGINAL DELIVERY      x 5     Social History     Socioeconomic History    Marital status: Single     Spouse name: Not on file    Number of children: Not on file    Years of education: Not on file    Highest education level: Not on file   Social Needs    Financial resource strain: Not on file    Food insecurity - worry: Not on file    Food insecurity - " "inability: Not on file    Transportation needs - medical: Not on file    Transportation needs - non-medical: Not on file   Occupational History    Not on file   Tobacco Use    Smoking status: Never Smoker    Smokeless tobacco: Never Used   Substance and Sexual Activity    Alcohol use: No    Drug use: No    Sexual activity: Not on file   Other Topics Concern    Not on file   Social History Narrative    Not on file     No family history on file.    Review of patient's allergies indicates:   Allergen Reactions    Peas Swelling          Medication List           Accurate as of 1/23/19  8:35 AM. If you have any questions, ask your nurse or doctor.               CONTINUE taking these medications    carvedilol 3.125 MG tablet  Commonly known as:  COREG  Take 1 tablet (3.125 mg total) by mouth 2 (two) times daily.     levocetirizine 5 MG tablet  Commonly known as:  XYZAL  Take 1 tablet (5 mg total) by mouth every evening.     lisinopril 20 MG tablet  Commonly known as:  PRINIVIL,ZESTRIL  Take 1 tablet (20 mg total) by mouth once daily.            Review of Systems  Constitution: Denies chills, fever, and sweats.  HENT: Denies headaches or blurry vision.  Cardiovascular: Denies chest pain or irregular heart beat.  Respiratory: Positive for shortness of breath on exertion.  Gastrointestinal: Denies abdominal pain, nausea, or vomiting.  Musculoskeletal: Denies muscle cramps.  Neurological: Denies dizziness or focal weakness.  Psychiatric/Behavioral: Normal mental status.  Hematologic/Lymphatic: Denies bleeding problem or easy bruising/bleeding.  Skin: Denies rash or suspicious lesions    Physical Examination  BP (!) 134/90 (BP Location: Left arm, Patient Position: Sitting, BP Method: X-Large (Automatic))   Pulse 100   Ht 5' 2" (1.575 m)   Wt 80.6 kg (177 lb 9.6 oz)   LMP 05/04/2018 Comment: Fibroids bleeding.  SpO2 98%   BMI 32.48 kg/m²     Constitutional: No acute distress, conversant  HEENT: Sclera " anicteric, Pupils equal, round and reactive to light, extraocular motions intact, Oropharynx clear  Neck: No JVD, no carotid bruits  Cardiovascular: regular rate and rhythm, no murmur, rubs or gallops, normal S1/S2  Pulmonary: Clear to auscultation bilaterally  Abdominal: Abdomen soft, nontender, nondistended, positive bowel sounds  Extremities: No lower extremity edema,   Pulses:  Carotid pulses are 2+ on the right side, and 2+ on the left side.  Radial pulses are 2+ on the right side, and 2+ on the left side.   Femoral pulses are 2+ on the right side, and 2+ on the left side.  Skin: No ecchymosis, erythema, or ulcers  Psych: Alert and oriented x 3, appropriate affect  Neuro: CNII-XII intact, no focal deficits    Labs:  Most Recent Data  CBC:   Lab Results   Component Value Date    WBC 9.50 01/04/2019    HGB 9.0 (L) 01/04/2019    HCT 31.0 (L) 01/04/2019     (H) 01/04/2019    MCV 72 (L) 01/04/2019    RDW 18.2 (H) 01/04/2019     BMP:   Lab Results   Component Value Date     01/04/2019    K 3.3 (L) 01/04/2019     01/04/2019    CO2 23 01/04/2019    BUN 16 01/04/2019    CREATININE 0.9 01/04/2019     01/04/2019    CALCIUM 9.7 01/04/2019    MG 2.1 01/04/2019     LFTS;   Lab Results   Component Value Date    PROT 7.7 01/04/2019    ALBUMIN 3.6 01/04/2019    BILITOT 0.5 01/04/2019    AST 18 01/04/2019    ALKPHOS 84 01/04/2019    ALT 15 01/04/2019     COAGS:   Lab Results   Component Value Date    INR 1.1 09/13/2018     FLP:   Lab Results   Component Value Date    CHOL 156 09/13/2018    HDL 38 (L) 09/13/2018    LDLCALC 102.6 09/13/2018    TRIG 77 09/13/2018    CHOLHDL 24.4 09/13/2018     CARDIAC:   Lab Results   Component Value Date    TROPONINI 0.014 01/04/2019     (H) 01/03/2019       EKG 1/3/2019:  Sinus tachycardia  Left bundle branch block    Echo 1/4/2019:  · Severely decreased left ventricular systolic function. The estimated ejection fraction is 15%  · Left ventricular diastolic  dysfunction.  · Moderate left ventricular enlargement.  · Normal right ventricular systolic function.  · The estimated PA systolic pressure is 21 mm Hg      Assessment/Plan:  Corrine Malik is a 53 y.o. female with a past medical history of new onset CHF (EF 15%), fibroids s/p total abdominal hysterectomy w/ bilateral salpingoophorectomy, who presents for an initial appointment.      1. New Onset CHF/Dilated Cardiomyopathy- Pt with NYHA class III symptoms.  Etiology unclear.  Review of EKG's show pt developed a LBBB between 12/23/2016 and 9/13/2018.  Discontinue lisinopril due to cough and start candesartan 16 mg daily.  Continue carvedilol 3.125 mg bid.  Start lasix 40 mg daily.  Schedule for right and left heart cath to evaluate further on 1/31/2019.  Arrange for Life Vest.    The risks, benefits, and alternatives of coronary vascular angiography and possible intervention were discussed with pt. All questions were answered and informed consent was obtained. I had a detailed discussion with the patient regarding risk of stroke, MI, bleeding access site complications, renal failure, emergent need for heart surgery, acute limb complications including ischemia and loss, contrast allergy and death. Pt understands the risks and benefits of the procedure and wishes to proceed. If stents are needed and there is preference for ALEKSANDRA, pt understands that would necessitate aspirin for life with plavix for at least 1 year. Additionally, pt is aware that non compliance is likely to result in stent clotting with heart attack, heart failure, and/or death.    2. HTN- Start candesartan 16 mg daily.  Continue carvedilol 3.125 mg bid.      Follow up 1 week after cath    Total duration of face to face visit time 30 minutes.  Total time spent counseling greater than fifty percent of total visit time.  Counseling included discussion regarding imaging findings, diagnosis, possibilities, treatment options, risks and benefits.  The  patient had many questions regarding the options and long-term effects.    Jose De Jesus Thomas MD, PhD  Interventional Cardiology

## 2019-01-23 NOTE — PATIENT INSTRUCTIONS
Assessment/Plan:  Corrine Malik is a 53 y.o. female with a past medical history of new onset CHF (EF 15%), fibroids s/p total abdominal hysterectomy w/ bilateral salpingoophorectomy, who presents for an initial appointment.      1. New Onset CHF/Dilated Cardiomyopathy- Pt with NYHA class III symptoms.  Etiology unclear.  Review of EKG's show pt developed a LBBB between 12/23/2016 and 9/13/2018.  Discontinue lisinopril due to cough and start candesartan 8 mg daily.  Continue carvedilol 3.125 mg bid.  Start lasix 40 mg daily.  Schedule for right and left heart cath to evaluate further on 1/31/2019.  Arrange for Life Vest.    The risks, benefits, and alternatives of coronary vascular angiography and possible intervention were discussed with pt. All questions were answered and informed consent was obtained. I had a detailed discussion with the patient regarding risk of stroke, MI, bleeding access site complications, renal failure, emergent need for heart surgery, acute limb complications including ischemia and loss, contrast allergy and death. Pt understands the risks and benefits of the procedure and wishes to proceed. If stents are needed and there is preference for ALEKSANDRA, pt understands that would necessitate aspirin for life with plavix for at least 1 year. Additionally, pt is aware that non compliance is likely to result in stent clotting with heart attack, heart failure, and/or death.    2. HTN- Start candesartan 8 mg daily.  Continue carvedilol 3.125 mg bid.      Follow up 1 week after cath

## 2019-01-23 NOTE — LETTER
January 23, 2019      Omari Garland, DO  [address]  [address]  [facility] [address]           [facility]  [address]  [place]   [place] [address]  Phone: [phone]  Fax: [phone]          Patient: Corrine Malik   MR Number: [id number]   YOB: 1965   Date of Visit: 1/23/2019       Dear Dr. Omari Garland:    Thank you for referring Corrine Malik to me for evaluation. Attached you will find relevant portions of my assessment and plan of care.    If you have questions, please do not hesitate to call me. I look forward to following Corrine Malik along with you.    Sincerely,    Jose De Jesus Thomas MD PhD    Enclosure  CC:  No Recipients    If you would like to receive this communication electronically, please contact [email or web address] or [phone] to request more information on [employer] access.    For providers and/or their staff who would like to refer a patient to [facility], please contact us through our one-stop-shop provider referral line, [facility], at [phone].    If you feel you have received this communication in error or would no longer like to receive these types of communications, please e-mail [email or web address]

## 2019-01-25 ENCOUNTER — TELEPHONE (OUTPATIENT)
Dept: CARDIOLOGY | Facility: CLINIC | Age: 54
End: 2019-01-25

## 2019-01-25 RX ORDER — LISINOPRIL 20 MG/1
20 TABLET ORAL DAILY
Qty: 90 TABLET | Refills: 3 | Status: SHIPPED | OUTPATIENT
Start: 2019-01-25 | End: 2019-01-28 | Stop reason: ALTCHOICE

## 2019-01-25 NOTE — TELEPHONE ENCOUNTER
Patient states Rx Atacand prescribed is too expensive and would like something else called in please advise

## 2019-01-25 NOTE — TELEPHONE ENCOUNTER
----- Message from Caro Mckenzie sent at 1/25/2019  3:57 PM CST -----  Contact: self, [phone] (M)  Patient requests to speak with you regarding blood pressure prescription not covered by her insurance and requesting something else prescribed.  Please advise.

## 2019-01-28 ENCOUNTER — HOSPITAL ENCOUNTER (OUTPATIENT)
Dept: RADIOLOGY | Facility: HOSPITAL | Age: 54
Discharge: HOME | End: 2019-01-28
Attending: INTERNAL MEDICINE
Payer: MEDICAID

## 2019-01-28 DIAGNOSIS — I50.23 ACUTE ON CHRONIC SYSTOLIC CONGESTIVE HEART FAILURE: ICD-10-CM

## 2019-01-28 DIAGNOSIS — I42.0 CARDIOMYOPATHY, DILATED: ICD-10-CM

## 2019-01-28 DIAGNOSIS — I10 ESSENTIAL HYPERTENSION: ICD-10-CM

## 2019-01-28 PROCEDURE — 71046 X-RAY EXAM CHEST 2 VIEWS: CPT | Mod: TC,FY,PO

## 2019-01-28 RX ORDER — CANDESARTAN 16 MG/1
16 TABLET ORAL DAILY
Qty: 90 TABLET | Refills: 3 | Status: SHIPPED | OUTPATIENT
Start: 2019-01-28 | End: 2019-02-18 | Stop reason: ALTCHOICE

## 2019-02-04 ENCOUNTER — TELEPHONE (OUTPATIENT)
Dept: CARDIOLOGY | Facility: CLINIC | Age: 54
End: 2019-02-04

## 2019-02-04 NOTE — TELEPHONE ENCOUNTER
----- Message from Rachael Castro sent at 2/4/2019  4:55 PM CST -----  Contact: [phone]/self  Patient is requesting a call back. Feb. 11 is fine. She will call back for the time. Thanks

## 2019-02-08 ENCOUNTER — TELEPHONE (OUTPATIENT)
Dept: CARDIOLOGY | Facility: CLINIC | Age: 54
End: 2019-02-08

## 2019-02-08 NOTE — TELEPHONE ENCOUNTER
----- Message from Hermelindo Reid sent at 2/8/2019  1:35 PM CST -----  Contact: self   Please reschedule patient appointment around 10 a, please give patient a call.    elysia.

## 2019-02-13 ENCOUNTER — TELEPHONE (OUTPATIENT)
Dept: CARDIOLOGY | Facility: CLINIC | Age: 54
End: 2019-02-13

## 2019-02-13 NOTE — TELEPHONE ENCOUNTER
----- Message from Liza Manning sent at 2/13/2019  9:23 AM CST -----  Contact: [phone]/self  Patient would like to be seen sooner than the next available appointment. Please advise.

## 2019-02-18 ENCOUNTER — OFFICE VISIT (OUTPATIENT)
Dept: CARDIOLOGY | Facility: CLINIC | Age: 54
End: 2019-02-18
Payer: MEDICAID

## 2019-02-18 VITALS
SYSTOLIC BLOOD PRESSURE: 160 MMHG | DIASTOLIC BLOOD PRESSURE: 100 MMHG | OXYGEN SATURATION: 98 % | BODY MASS INDEX: 32.32 KG/M2 | HEIGHT: 62 IN | HEART RATE: 79 BPM | WEIGHT: 175.63 LBS

## 2019-02-18 DIAGNOSIS — I50.40 COMBINED SYSTOLIC AND DIASTOLIC CONGESTIVE HEART FAILURE, UNSPECIFIED HF CHRONICITY: ICD-10-CM

## 2019-02-18 DIAGNOSIS — I42.8 NONISCHEMIC CARDIOMYOPATHY: Primary | ICD-10-CM

## 2019-02-18 DIAGNOSIS — I42.0 CARDIOMYOPATHY, DILATED: ICD-10-CM

## 2019-02-18 PROCEDURE — 99215 PR OFFICE/OUTPT VISIT, EST, LEVL V, 40-54 MIN: ICD-10-PCS | Mod: S$PBB,,, | Performed by: INTERNAL MEDICINE

## 2019-02-18 PROCEDURE — 99999 PR PBB SHADOW E&M-EST. PATIENT-LVL III: CPT | Mod: PBBFAC,,, | Performed by: INTERNAL MEDICINE

## 2019-02-18 PROCEDURE — 99215 OFFICE O/P EST HI 40 MIN: CPT | Mod: S$PBB,,, | Performed by: INTERNAL MEDICINE

## 2019-02-18 PROCEDURE — 99999 PR PBB SHADOW E&M-EST. PATIENT-LVL III: ICD-10-PCS | Mod: PBBFAC,,, | Performed by: INTERNAL MEDICINE

## 2019-02-18 PROCEDURE — 99213 OFFICE O/P EST LOW 20 MIN: CPT | Mod: PBBFAC,PN | Performed by: INTERNAL MEDICINE

## 2019-02-18 NOTE — PATIENT INSTRUCTIONS
Assessment/Plan:  Corrine Malik is a 53 y.o. female with a past medical history of new onset CHF (EF 15%), fibroids s/p total abdominal hysterectomy w/ bilateral salpingoophorectomy, who presents for a follow up appointment.      1. New Onset CHF/Dilated Cardiomyopathy- Pt with non-ischemic cardiomyopathy with NYHA class II symptoms.  Discontinue candesartan 16 mg daily and start Entresto 24/26 mg bid.  Continue carvedilol 3.125 mg bid and lasix 40 mg daily.      2. HTN- Pt to monitor blood pressure on above regimen.        Follow up in 4 weeks

## 2019-02-18 NOTE — PROGRESS NOTES
"[facility]    Chief Complaint   Patient presents with    Hospital Follow Up     S/P Angiogram       Patient ID: Corrine Malik is a 53 y.o. female with a past medical history of fibroids s/p total abdominal hysterectomy w/ bilateral salpingoophorectomy, who presents for an initial appointment.  Pertinent history/events are as follows:     -On 1/3/2019, pt presented to [facility] with SOB x 10 days.  Found to have new onset chf.  Echo from 1/4/2019 showed   · Severely decreased left ventricular systolic function. The estimated ejection fraction is 15%  · Left ventricular diastolic dysfunction.  · Moderate left ventricular enlargement.  · Normal right ventricular systolic function.  · The estimated PA systolic pressure is 21 mm Hg  ·   EKG from 1/3/2019 showed Sinus tachycardia with Left bundle branch block (present on EKG's since 9/13/2018).  Pt discharged on carvedilol 3.125 mg bid and lisinopril 20 mg daily.      -At our initial clinic visit on 1/23/2019, Mrs. Malik reported SOB on exertion since hospital discharge.  States sometimes when she eats, her stomach "swells".  Complains of severe cough since starting lisinopril.  She has no chest pain or LE edema.  Blood pressure today is 134/90.   Plan: Pt with NYHA class III symptoms.  Etiology unclear.  Review of EKG's show pt developed a LBBB between 12/23/2016 and 9/13/2018.  Discontinue lisinopril due to cough and start candesartan 16 mg daily.  Continue carvedilol 3.125 mg bid.  Start lasix 40 mg daily.  Schedule for right and left heart cath to evaluate further on 1/31/2019.  Arrange for Life Vest.    -on 1/31/2019, Right heart cath was performed via the right IJ vein and revealed:     RA-18/15 (16)  RV- 57/14 (19)  PA 58/32 (43)  PCW 41/42 (38)     CO 4.1  CI 2.25     Left heart cath was performed via the right radial artery and revealed:     Left Main- normal  LAD- normal  LCx- normal  RCA- small, normal     Findings consistent with " non-ischemic cardiomyopathy with significantly elevated right heart pressures.  Low cardiac output and index.       Plan:  -routine post cath care  -increase lasix to 40 mg bid  -continue Life Vest  -follow up in Cardiology clinic in 1 week    HPI:  Mrs. Malik reports doing much better since clinic visit on 1/23/2019.  SOB with exertion now significantly improved.  Cough resolved since stopping lisinopril.  Pt states she was unable to tolerate wearing the Life Vest all the time because of back pain.  States she only wears it at night.     Past Medical History:   Diagnosis Date    Cardiomyopathy     CHF (congestive heart failure)     Hypertension      Past Surgical History:   Procedure Laterality Date    CATHETERIZATION, HEART, BOTH LEFT AND RIGHT N/A 1/31/2019    Performed by [facility] De Jesus Thomas [facility] at [facility] LAB    HYSTERECTOMY, TOTAL, ABDOMINAL, WITH BILATERAL SALPINGO-OOPHORECTOMY N/A 11/12/2018    Performed by [facility]y Cormier [facility] at [facility] OR    REMOVAL-MASS (VAGINAL) N/A 11/12/2018    Performed by [facility] at [facility] OR    tubal ligation      VAGINAL DELIVERY      x 5     Social History     Socioeconomic History    Marital status: Single     Spouse name: Not on file    Number of children: Not on file    Years of education: Not on file    Highest education level: Not on file   Social Needs    Financial resource strain: Not on file    Food insecurity - worry: Not on file    Food insecurity - inability: Not on file    Transportation needs - medical: Not on file    Transportation needs - non-medical: Not on file   Occupational History    Not on file   Tobacco Use    Smoking status: Never Smoker    Smokeless tobacco: Never Used   Substance and Sexual Activity    Alcohol use: No    Drug use: No    Sexual activity: Not on file   Other Topics Concern    Not on file   Social History Narrative    Not on file     Family History   Problem Relation Age of Onset    Hypertension Mother      "Stroke Mother     Kidney failure Father     Hypertension Father        Review of patient's allergies indicates:   Allergen Reactions    Peas Swelling       Medication List with Changes/Refills   Current Medications    CANDESARTAN (ATACAND) 16 MG TABLET    Take 1 tablet (16 mg total) by mouth once daily.    CARVEDILOL (COREG) 3.125 MG TABLET    Take 1 tablet (3.125 mg total) by mouth 2 (two) times daily.    FUROSEMIDE (LASIX) 40 MG TABLET    Take 1 tablet (40 mg total) by mouth 2 (two) times daily.    LEVOCETIRIZINE (XYZAL) 5 MG TABLET    Take 1 tablet (5 mg total) by mouth every evening.       Review of Systems  Constitution: Denies chills, fever, and sweats.  HENT: Denies headaches or blurry vision.  Cardiovascular: Denies chest pain or irregular heart beat.  Respiratory: Positive for shortness of breath on exertion.  Gastrointestinal: Denies abdominal pain, nausea, or vomiting.  Musculoskeletal: Denies muscle cramps.  Neurological: Denies dizziness or focal weakness.  Psychiatric/Behavioral: Normal mental status.  Hematologic/Lymphatic: Denies bleeding problem or easy bruising/bleeding.  Skin: Denies rash or suspicious lesions    Physical Examination  BP (!) 160/100 (BP Location: Left arm, Patient Position: Sitting, BP Method: Large (Manual))   Pulse 79   Ht 5' 2" (1.575 m)   Wt 79.7 kg (175 lb 9.6 oz)   LMP 05/04/2018 Comment: Fibroids bleeding.  SpO2 98%   BMI 32.12 kg/m²     Constitutional: No acute distress, conversant  HEENT: Sclera anicteric, Pupils equal, round and reactive to light, extraocular motions intact, Oropharynx clear  Neck: No JVD, no carotid bruits  Cardiovascular: regular rate and rhythm, no murmur, rubs or gallops, normal S1/S2  Pulmonary: Clear to auscultation bilaterally  Abdominal: Abdomen soft, nontender, nondistended, positive bowel sounds  Extremities: No lower extremity edema,   Pulses:  Carotid pulses are 2+ on the right side, and 2+ on the left side.  Radial pulses are 2+ on " the right side, and 2+ on the left side.   Femoral pulses are 2+ on the right side, and 2+ on the left side.  Skin: No ecchymosis, erythema, or ulcers  Psych: Alert and oriented x 3, appropriate affect  Neuro: CNII-XII intact, no focal deficits    Labs:  Most Recent Data  CBC:   Lab Results   Component Value Date    WBC 7.99 01/28/2019    HGB 9.6 (L) 01/28/2019    HCT 33.0 (L) 01/28/2019     01/28/2019    MCV 71 (L) 01/28/2019    RDW 20.0 (H) 01/28/2019     BMP:   Lab Results   Component Value Date     01/28/2019    K 4.0 01/28/2019     01/28/2019    CO2 29 01/28/2019    BUN 16 01/28/2019    CREATININE 0.94 01/28/2019    GLU 97 01/28/2019    CALCIUM 9.8 01/28/2019    MG 2.1 01/04/2019     LFTS;   Lab Results   Component Value Date    PROT 8.3 01/28/2019    ALBUMIN 4.6 01/28/2019    BILITOT 0.5 01/28/2019    AST 20 01/28/2019    ALKPHOS 81 01/28/2019    ALT 12 01/28/2019     COAGS:   Lab Results   Component Value Date    INR 1.3 (H) 01/28/2019     FLP:   Lab Results   Component Value Date    CHOL 156 09/13/2018    HDL 38 (L) 09/13/2018    LDLCALC 102.6 09/13/2018    TRIG 77 09/13/2018    CHOLHDL 24.4 09/13/2018     CARDIAC:   Lab Results   Component Value Date    TROPONINI 0.014 01/04/2019     (H) 01/03/2019       EKG 1/3/2019:  Sinus tachycardia  Left bundle branch block    Echo 1/4/2019:  · Severely decreased left ventricular systolic function. The estimated ejection fraction is 15%  · Left ventricular diastolic dysfunction.  · Moderate left ventricular enlargement.  · Normal right ventricular systolic function.  · The estimated PA systolic pressure is 21 mm Hg      Assessment/Plan:  Corrine Malik is a 53 y.o. female with a past medical history of new onset CHF (EF 15%), fibroids s/p total abdominal hysterectomy w/ bilateral salpingoophorectomy, who presents for a follow up appointment.      1. New Onset CHF/Dilated Cardiomyopathy- Pt with non-ischemic cardiomyopathy with NYHA class  II symptoms.  Discontinue candesartan 16 mg daily and start Entresto 24/26 mg bid.  Continue carvedilol 3.125 mg bid and lasix 40 mg daily.      2. HTN- Pt to monitor blood pressure on above regimen.        Follow up in 4 weeks    Total duration of face to face visit time 30 minutes.  Total time spent counseling greater than fifty percent of total visit time.  Counseling included discussion regarding imaging findings, diagnosis, possibilities, treatment options, risks and benefits.  The patient had many questions regarding the options and long-term effects.    Jose De Jesus Thomas MD, PhD  Interventional Cardiology

## 2019-02-27 ENCOUNTER — OFFICE VISIT (OUTPATIENT)
Dept: FAMILY MEDICINE | Facility: HOSPITAL | Age: 54
End: 2019-02-27
Attending: FAMILY MEDICINE
Payer: MEDICAID

## 2019-02-27 VITALS
BODY MASS INDEX: 32.57 KG/M2 | DIASTOLIC BLOOD PRESSURE: 89 MMHG | HEIGHT: 62 IN | HEART RATE: 67 BPM | SYSTOLIC BLOOD PRESSURE: 151 MMHG | WEIGHT: 177 LBS

## 2019-02-27 DIAGNOSIS — I42.8 NONISCHEMIC CARDIOMYOPATHY: ICD-10-CM

## 2019-02-27 DIAGNOSIS — I50.20 NYHA CLASS 2 HEART FAILURE WITH REDUCED EJECTION FRACTION: ICD-10-CM

## 2019-02-27 DIAGNOSIS — Z28.20 IMMUNIZATION NOT CARRIED OUT BECAUSE OF PATIENT DECISION FOR UNSPECIFIED REASON: ICD-10-CM

## 2019-02-27 DIAGNOSIS — I10 ESSENTIAL HYPERTENSION: Primary | ICD-10-CM

## 2019-02-27 PROCEDURE — 99213 OFFICE O/P EST LOW 20 MIN: CPT | Performed by: STUDENT IN AN ORGANIZED HEALTH CARE EDUCATION/TRAINING PROGRAM

## 2019-02-27 NOTE — PROGRESS NOTES
Subjective:       Patient ID: Corrine Malik is a 53 y.o. female.    Chief Complaint: Hypertension follow-up    HPI   Mrs. Corrine Malik is a 52 yo F w/ pmhx of prolapsed uterine fibroid s/p TONI-BSO (11/12/2018), HTN, and non-ischemic cardiomyopathy NYHA class II (negative cath on 1/31/2019) who presents to the clinic for HTN follow-up. She endorses that she has been compliant with her medication. Her BP was 151/89. She is awaiting approval for Entresto and once approved will discontinue current ARB. She is asymptomatic which includes but not limited to: SOB, CP, light headed, visual changes, pallor, or heart palpitations. She also denies any fever, chills, nausea, constipation, diarrhea, no urinary symptoms or HA. Her recent echo from 1/4/19 revealed an EF of 15%. She endorses that her symptoms are improving since her our last visit in 1/2019.     Review of Systems    Constitutional: Negative for activity change, chills, and fever.   HENT: Negative for congestion, rhinorrhea and sore throat.    Eyes: Negative for visual disturbance.   Respiratory: Negative for cough, chest tightness. +MUNOZ. + SOB w/ exertion  Cardiovascular: Negative for chest pain, palpitations and leg swelling.   Gastrointestinal: Negative for constipation, diarrhea, nausea and vomiting.   Genitourinary: Negative for dysuria and hematuria.   Musculoskeletal: Negative for arthralgias and myalgias.   Skin: Negative for rash.   Neurological: Negative for dizziness, light-headedness and headaches.     Objective:      Vitals:    02/27/19 1109   BP: (!) 151/89   Pulse: 67     Physical Exam    Constitutional: She is oriented to person, place, and time. NAD   Head: Normocephalic and atraumatic.   Cardiovascular: Normal rate, regular rhythm, and intact distal pulses.   Pulmonary/Chest: Effort normal and breath sounds normal.   Abdominal: Soft. Bowel sounds are normal. She exhibits no distension, no tenderness, no rebound/guarding.   Musculoskeletal:   She exhibits no edema, tenderness or deformity.   Neurological: AAOx3. No focal neurological deficit.   Skin: Skin is warm and dry and not diaphoretic.   Nursing note and vitals reviewed.    Assessment:       1. Nonischemic cardiomyopathy    2. NYHA class 2 heart failure with reduced ejection fraction    3. Essential hypertension    4. Immunization not carried out because of patient decision for unspecified reason        Plan:       Nonischemic cardiomyopathy (NYHA class 2 heart failure with reduced ejection fraction)   - Continue medical management as per cardiology recommendations   - Awaiting approval for Entresto   - Encouraged to call the clinic for any additional questions or concerns.     Essential hypertension   - Continue medical management   - /89. Awaiting approval for Entresto and may need to adjust dosage in order to obtain better BP management    Follow-up in about 3 months (around 5/27/2019).

## 2019-03-06 DIAGNOSIS — I42.0 CARDIOMYOPATHY, DILATED: ICD-10-CM

## 2019-03-06 DIAGNOSIS — I50.40 COMBINED SYSTOLIC AND DIASTOLIC CONGESTIVE HEART FAILURE, UNSPECIFIED HF CHRONICITY: ICD-10-CM

## 2019-03-06 DIAGNOSIS — I42.8 NONISCHEMIC CARDIOMYOPATHY: ICD-10-CM

## 2019-03-06 NOTE — TELEPHONE ENCOUNTER
----- Message from Frank Neri sent at 3/6/2019  1:49 PM CST -----  Contact: [phone]/jhwq  Patient states her insurance doesn't cover sacubitril-valsartan (ENTRESTO) 24-26 mg per tablet she would like something else called in

## 2019-03-06 NOTE — TELEPHONE ENCOUNTER
Dr Thomas advised patient to continue taking all of her medication until we get an approval for Entresto.

## 2019-03-11 ENCOUNTER — TELEPHONE (OUTPATIENT)
Dept: PHARMACY | Facility: CLINIC | Age: 54
End: 2019-03-11

## 2019-03-21 ENCOUNTER — TELEPHONE (OUTPATIENT)
Dept: CARDIOLOGY | Facility: CLINIC | Age: 54
End: 2019-03-21

## 2019-03-21 NOTE — TELEPHONE ENCOUNTER
----- Message from Andie Dotson sent at 3/21/2019  2:20 PM CDT -----  Contact: [phone]  Patient advised she had to reschedule her appt because she will not get the sacubitril-valsartan (ENTRESTO) 24-26 mg per tablet until tomorrow. Please call.

## 2019-04-05 NOTE — TELEPHONE ENCOUNTER
2nd attempt for Entresto initial F/U and confirm start date.  Initial fill sent on 3/21/19.  M for call back.

## 2019-04-17 ENCOUNTER — OFFICE VISIT (OUTPATIENT)
Dept: CARDIOLOGY | Facility: CLINIC | Age: 54
End: 2019-04-17
Payer: MEDICAID

## 2019-04-17 VITALS
HEIGHT: 62 IN | WEIGHT: 175 LBS | HEART RATE: 70 BPM | OXYGEN SATURATION: 98 % | DIASTOLIC BLOOD PRESSURE: 82 MMHG | BODY MASS INDEX: 32.2 KG/M2 | SYSTOLIC BLOOD PRESSURE: 143 MMHG

## 2019-04-17 DIAGNOSIS — I42.0 CARDIOMYOPATHY, DILATED: ICD-10-CM

## 2019-04-17 DIAGNOSIS — I42.8 NONISCHEMIC CARDIOMYOPATHY: Primary | ICD-10-CM

## 2019-04-17 DIAGNOSIS — I50.33 ACUTE ON CHRONIC DIASTOLIC CONGESTIVE HEART FAILURE: ICD-10-CM

## 2019-04-17 DIAGNOSIS — I10 ESSENTIAL HYPERTENSION: ICD-10-CM

## 2019-04-17 DIAGNOSIS — I50.20 NYHA CLASS 2 HEART FAILURE WITH REDUCED EJECTION FRACTION: ICD-10-CM

## 2019-04-17 PROCEDURE — 99215 PR OFFICE/OUTPT VISIT, EST, LEVL V, 40-54 MIN: ICD-10-PCS | Mod: S$PBB,,, | Performed by: INTERNAL MEDICINE

## 2019-04-17 PROCEDURE — 99215 OFFICE O/P EST HI 40 MIN: CPT | Mod: S$PBB,,, | Performed by: INTERNAL MEDICINE

## 2019-04-17 PROCEDURE — 99999 PR PBB SHADOW E&M-EST. PATIENT-LVL III: CPT | Mod: PBBFAC,,, | Performed by: INTERNAL MEDICINE

## 2019-04-17 PROCEDURE — 99999 PR PBB SHADOW E&M-EST. PATIENT-LVL III: ICD-10-PCS | Mod: PBBFAC,,, | Performed by: INTERNAL MEDICINE

## 2019-04-17 PROCEDURE — 99213 OFFICE O/P EST LOW 20 MIN: CPT | Mod: PBBFAC,PN | Performed by: INTERNAL MEDICINE

## 2019-04-17 RX ORDER — CARVEDILOL 3.12 MG/1
6.25 TABLET ORAL 2 TIMES DAILY
Qty: 360 TABLET | Refills: 3 | Status: SHIPPED | OUTPATIENT
Start: 2019-04-17 | End: 2019-05-20

## 2019-04-17 NOTE — PROGRESS NOTES
"[facility]    Chief Complaint   Patient presents with    Follow-up    Hypertension       Patient ID: Corrine Malik is a 53 y.o. female with a past medical history of fibroids s/p total abdominal hysterectomy w/ bilateral salpingoophorectomy, who presents for a follow up appointment.  Pertinent history/events are as follows:     -On 1/3/2019, pt presented to [facility] with SOB x 10 days.  Found to have new onset chf.  Echo from 1/4/2019 showed   · Severely decreased left ventricular systolic function. The estimated ejection fraction is 15%  · Left ventricular diastolic dysfunction.  · Moderate left ventricular enlargement.  · Normal right ventricular systolic function.  · The estimated PA systolic pressure is 21 mm Hg  ·   EKG from 1/3/2019 showed Sinus tachycardia with Left bundle branch block (present on EKG's since 9/13/2018).  Pt discharged on carvedilol 3.125 mg bid and lisinopril 20 mg daily.      -At our initial clinic visit on 1/23/2019, Mrs. Malik reported SOB on exertion since hospital discharge.  States sometimes when she eats, her stomach "swells".  Complains of severe cough since starting lisinopril.  She has no chest pain or LE edema.  Blood pressure today is 134/90.   Plan: Pt with NYHA class III symptoms.  Etiology unclear.  Review of EKG's show pt developed a LBBB between 12/23/2016 and 9/13/2018.  Discontinue lisinopril due to cough and start candesartan 16 mg daily.  Continue carvedilol 3.125 mg bid.  Start lasix 40 mg daily.  Schedule for right and left heart cath to evaluate further on 1/31/2019.  Arrange for Life Vest.    -on 1/31/2019, Right heart cath was performed via the right IJ vein and revealed:     RA-18/15 (16)  RV- 57/14 (19)  PA 58/32 (43)  PCW 41/42 (38)     CO 4.1  CI 2.25     Left heart cath was performed via the right radial artery and revealed:     Left Main- normal  LAD- normal  LCx- normal  RCA- small, normal     Findings consistent with " non-ischemic cardiomyopathy with significantly elevated right heart pressures.  Low cardiac output and index.       Plan:  -routine post cath care  -increase lasix to 40 mg bid  -continue Life Vest  -follow up in Cardiology clinic in 1 week    -At follow up clinic visit on 2/18/2019, Mrs. Malik reports doing much better since clinic visit on 1/23/2019.  SOB with exertion now significantly improved.  Cough resolved since stopping lisinopril.  Pt states she was unable to tolerate wearing the Life Vest all the time because of back pain.  States she only wears it at night.   Plan: Pt with non-ischemic cardiomyopathy with NYHA class II symptoms.  Discontinue candesartan 16 mg daily and start Entresto 24/26 mg bid.  Continue carvedilol 3.125 mg bid and lasix 40 mg daily.  Pt to monitor blood pressure on this regimen.    HPI:  Mrs. Malik reports no significant SOB or LE edema.  Appears well compensated on exam with clear lungs and no elevated JVD.  Blood pressure today is 143/82.      Past Medical History:   Diagnosis Date    Cardiomyopathy     CHF (congestive heart failure)     Hypertension      Past Surgical History:   Procedure Laterality Date    CATHETERIZATION, HEART, BOTH LEFT AND RIGHT N/A 1/31/2019    Performed by [facility] at [facility] CATH LAB    HYSTERECTOMY, TOTAL, ABDOMINAL, WITH BILATERAL SALPINGO-OOPHORECTOMY N/A 11/12/2018    [facility]formed [facility]    REMOVAL-MASS (VAGINAL) N/A 11/12/2018    [facility] at [facility]    tubal ligation      VAGINAL DELIVERY      x 5     Social History     Socioeconomic History    Marital status: Single     Spouse name: Not on file    Number of children: Not on file    Years of education: Not on file    Highest education level: Not on file   Occupational History    Not on file   Social Needs    Financial resource strain: Not on file    Food insecurity:     Worry: Not on file     Inability: Not on file     Transportation needs:     Medical: Not on file     Non-medical: Not on file   Tobacco Use    Smoking status: Never Smoker    Smokeless tobacco: Never Used   Substance and Sexual Activity    Alcohol use: No    Drug use: No    Sexual activity: Not on file   Lifestyle    Physical activity:     Days per week: Not on file     Minutes per session: Not on file    Stress: Not on file   Relationships    Social connections:     Talks on phone: Not on file     Gets together: Not on file     Attends [employer] service: Not on file     Active member of club or organization: Not on file     Attends meetings of clubs or organizations: Not on file     Relationship status: Not on file   Other Topics Concern    Not on file   Social History Narrative    Not on file     Family History   Problem Relation Age of Onset    Hypertension Mother     Stroke Mother     Kidney failure Father     Hypertension Father        Review of patient's allergies indicates:   Allergen Reactions    Peas Swelling       Medication List with Changes/Refills   Current Medications    CARVEDILOL (COREG) 3.125 MG TABLET    Take 1 tablet (3.125 mg total) by mouth 2 (two) times daily.    FUROSEMIDE (LASIX) 40 MG TABLET    Take 1 tablet (40 mg total) by mouth 2 (two) times daily.    SACUBITRIL-VALSARTAN (ENTRESTO) 24-26 MG PER TABLET    Take 1 tablet by mouth 2 (two) times daily.   Discontinued Medications    LEVOCETIRIZINE (XYZAL) 5 MG TABLET    Take 1 tablet (5 mg total) by mouth every evening.       Review of Systems  Constitution: Denies chills, fever, and sweats.  HENT: Denies headaches or blurry vision.  Cardiovascular: Denies chest pain or irregular heart beat.  Respiratory: Positive for shortness of breath on exertion.  Gastrointestinal: Denies abdominal pain, nausea, or vomiting.  Musculoskeletal: Denies muscle cramps.  Neurological: Denies dizziness or focal weakness.  Psychiatric/Behavioral: Normal mental status.  Hematologic/Lymphatic: Denies  "bleeding problem or easy bruising/bleeding.  Skin: Denies rash or suspicious lesions    Physical Examination  BP (!) 143/82 (BP Location: Left arm, Patient Position: Sitting, BP Method: X-Large (Automatic))   Pulse 70   Ht 5' 2" (1.575 m)   Wt 79.4 kg (175 lb)   LMP 05/04/2018 Comment: Fibroids bleeding.  SpO2 98%   BMI 32.01 kg/m²     Constitutional: No acute distress, conversant  HEENT: Sclera anicteric, Pupils equal, round and reactive to light, extraocular motions intact, Oropharynx clear  Neck: No JVD, no carotid bruits  Cardiovascular: regular rate and rhythm, no murmur, rubs or gallops, normal S1/S2  Pulmonary: Clear to auscultation bilaterally  Abdominal: Abdomen soft, nontender, nondistended, positive bowel sounds  Extremities: No lower extremity edema,   Pulses:  Carotid pulses are 2+ on the right side, and 2+ on the left side.  Radial pulses are 2+ on the right side, and 2+ on the left side.   Femoral pulses are 2+ on the right side, and 2+ on the left side.  Skin: No ecchymosis, erythema, or ulcers  Psych: Alert and oriented x 3, appropriate affect  Neuro: CNII-XII intact, no focal deficits    Labs:  Most Recent Data  CBC:   Lab Results   Component Value Date    WBC 7.99 01/28/2019    HGB 9.6 (L) 01/28/2019    HCT 33.0 (L) 01/28/2019     01/28/2019    MCV 71 (L) 01/28/2019    RDW 20.0 (H) 01/28/2019     BMP:   Lab Results   Component Value Date     01/28/2019    K 4.0 01/28/2019     01/28/2019    CO2 29 01/28/2019    BUN 16 01/28/2019    CREATININE 0.94 01/28/2019    GLU 97 01/28/2019    CALCIUM 9.8 01/28/2019    MG 2.1 01/04/2019     LFTS;   Lab Results   Component Value Date    PROT 8.3 01/28/2019    ALBUMIN 4.6 01/28/2019    BILITOT 0.5 01/28/2019    AST 20 01/28/2019    ALKPHOS 81 01/28/2019    ALT 12 01/28/2019     COAGS:   Lab Results   Component Value Date    INR 1.3 (H) 01/28/2019     FLP:   Lab Results   Component Value Date    CHOL 156 09/13/2018    HDL 38 (L) " 09/13/2018    LDLCALC 102.6 09/13/2018    TRIG 77 09/13/2018    CHOLHDL 24.4 09/13/2018     CARDIAC:   Lab Results   Component Value Date    TROPONINI 0.014 01/04/2019     (H) 01/03/2019       EKG 1/3/2019:  Sinus tachycardia  Left bundle branch block    Echo 1/4/2019:  · Severely decreased left ventricular systolic function. The estimated ejection fraction is 15%  · Left ventricular diastolic dysfunction.  · Moderate left ventricular enlargement.  · Normal right ventricular systolic function.  · The estimated PA systolic pressure is 21 mm Hg      Assessment/Plan:  Corrine Malik is a 53 y.o. female with a past medical history of new onset CHF (EF 15%), fibroids s/p total abdominal hysterectomy w/ bilateral salpingoophorectomy, who presents for a follow up appointment.      1. New Onset CHF/Dilated Cardiomyopathy- Pt with non-ischemic cardiomyopathy with NYHA class II symptoms.  Check cmp today.  If appropriate, increase Entresto to 49/51 mg bid.  Repeat cmp 1 week after starting increased dose of Entresto.  Increase carvedilol to 6.25 mg bid and lasix 40 mg daily.      2. HTN- Pt to monitor blood pressure on above regimen.        Follow up in 4 weeks    Total duration of face to face visit time 30 minutes.  Total time spent counseling greater than fifty percent of total visit time.  Counseling included discussion regarding imaging findings, diagnosis, possibilities, treatment options, risks and benefits.  The patient had many questions regarding the options and long-term effects.    Jose De Jesus Thomas MD, PhD  Interventional Cardiology

## 2019-04-17 NOTE — PATIENT INSTRUCTIONS
Assessment/Plan:  Corrine Malik is a 53 y.o. female with a past medical history of new onset CHF (EF 15%), fibroids s/p total abdominal hysterectomy w/ bilateral salpingoophorectomy, who presents for a follow up appointment.      1. New Onset CHF/Dilated Cardiomyopathy- Pt with non-ischemic cardiomyopathy with NYHA class II symptoms.  Check cmp today.  If appropriate, increase Entresto to 49/51 mg bid.  Repeat cmp 1 week after starting increased dose of Entresto.  Increase carvedilol to 6.25 mg bid and lasix 40 mg daily.      2. HTN- Pt to monitor blood pressure on above regimen.        Follow up in 4 weeks

## 2019-04-18 ENCOUNTER — LAB VISIT (OUTPATIENT)
Dept: LAB | Facility: HOSPITAL | Age: 54
End: 2019-04-18
Attending: INTERNAL MEDICINE
Payer: MEDICAID

## 2019-04-18 DIAGNOSIS — I42.0 CARDIOMYOPATHY, DILATED: ICD-10-CM

## 2019-04-18 DIAGNOSIS — I42.8 NONISCHEMIC CARDIOMYOPATHY: ICD-10-CM

## 2019-04-18 DIAGNOSIS — I50.40 COMBINED SYSTOLIC AND DIASTOLIC CONGESTIVE HEART FAILURE, UNSPECIFIED HF CHRONICITY: ICD-10-CM

## 2019-04-18 LAB
ALBUMIN SERPL BCP-MCNC: 4.1 G/DL (ref 3.5–5.2)
ALP SERPL-CCNC: 77 U/L (ref 38–126)
ALT SERPL W/O P-5'-P-CCNC: 16 U/L (ref 10–44)
ANION GAP SERPL CALC-SCNC: 8 MMOL/L (ref 8–16)
AST SERPL-CCNC: 22 U/L (ref 15–46)
BILIRUB SERPL-MCNC: 0.4 MG/DL (ref 0.1–1)
BUN SERPL-MCNC: 15 MG/DL (ref 7–17)
CALCIUM SERPL-MCNC: 9.5 MG/DL (ref 8.7–10.5)
CHLORIDE SERPL-SCNC: 104 MMOL/L (ref 95–110)
CO2 SERPL-SCNC: 29 MMOL/L (ref 23–29)
CREAT SERPL-MCNC: 0.79 MG/DL (ref 0.5–1.4)
EST. GFR  (AFRICAN AMERICAN): >60 ML/MIN/1.73 M^2
EST. GFR  (NON AFRICAN AMERICAN): >60 ML/MIN/1.73 M^2
GLUCOSE SERPL-MCNC: 134 MG/DL (ref 70–110)
POTASSIUM SERPL-SCNC: 3.5 MMOL/L (ref 3.5–5.1)
PROT SERPL-MCNC: 7.8 G/DL (ref 6–8.4)
SODIUM SERPL-SCNC: 141 MMOL/L (ref 136–145)

## 2019-04-18 PROCEDURE — 36415 COLL VENOUS BLD VENIPUNCTURE: CPT | Mod: PO

## 2019-04-18 PROCEDURE — 80053 COMPREHEN METABOLIC PANEL: CPT | Mod: PO

## 2019-04-22 ENCOUNTER — TELEPHONE (OUTPATIENT)
Dept: CARDIOLOGY | Facility: CLINIC | Age: 54
End: 2019-04-22

## 2019-04-22 NOTE — TELEPHONE ENCOUNTER
----- Message from Jermaine Zhong sent at 4/22/2019 10:09 AM CDT -----  Contact: [facility]/[phone]  Type:  Pharmacy Calling to Clarify an RX    Name of Caller: [facility]  Pharmacy Name: Same  Prescription Name: candesartan (ATACAND) 16 MG tablet   What do they need to clarify?: is this still a valid medication for the patient  Best Call Back Number: [phone]  Additional Information: says the patient is advising the doctor doubles her dose

## 2019-04-30 ENCOUNTER — LAB VISIT (OUTPATIENT)
Dept: LAB | Facility: HOSPITAL | Age: 54
End: 2019-04-30
Attending: INTERNAL MEDICINE
Payer: MEDICAID

## 2019-04-30 DIAGNOSIS — I42.8 NONISCHEMIC CARDIOMYOPATHY: ICD-10-CM

## 2019-04-30 DIAGNOSIS — I50.33 ACUTE ON CHRONIC DIASTOLIC CONGESTIVE HEART FAILURE: ICD-10-CM

## 2019-04-30 DIAGNOSIS — I10 ESSENTIAL HYPERTENSION: ICD-10-CM

## 2019-04-30 DIAGNOSIS — I50.20 NYHA CLASS 2 HEART FAILURE WITH REDUCED EJECTION FRACTION: ICD-10-CM

## 2019-04-30 DIAGNOSIS — I42.0 CARDIOMYOPATHY, DILATED: ICD-10-CM

## 2019-04-30 LAB
ALBUMIN SERPL BCP-MCNC: 4.2 G/DL (ref 3.5–5.2)
ALP SERPL-CCNC: 87 U/L (ref 38–126)
ALT SERPL W/O P-5'-P-CCNC: 15 U/L (ref 10–44)
ANION GAP SERPL CALC-SCNC: 6 MMOL/L (ref 8–16)
AST SERPL-CCNC: 24 U/L (ref 15–46)
BILIRUB SERPL-MCNC: 0.5 MG/DL (ref 0.1–1)
BUN SERPL-MCNC: 13 MG/DL (ref 7–17)
CALCIUM SERPL-MCNC: 9.6 MG/DL (ref 8.7–10.5)
CHLORIDE SERPL-SCNC: 106 MMOL/L (ref 95–110)
CO2 SERPL-SCNC: 30 MMOL/L (ref 23–29)
CREAT SERPL-MCNC: 0.75 MG/DL (ref 0.5–1.4)
EST. GFR  (AFRICAN AMERICAN): >60 ML/MIN/1.73 M^2
EST. GFR  (NON AFRICAN AMERICAN): >60 ML/MIN/1.73 M^2
GLUCOSE SERPL-MCNC: 104 MG/DL (ref 70–110)
POTASSIUM SERPL-SCNC: 3.8 MMOL/L (ref 3.5–5.1)
PROT SERPL-MCNC: 8 G/DL (ref 6–8.4)
SODIUM SERPL-SCNC: 142 MMOL/L (ref 136–145)

## 2019-04-30 PROCEDURE — 80053 COMPREHEN METABOLIC PANEL: CPT | Mod: PO

## 2019-04-30 PROCEDURE — 36415 COLL VENOUS BLD VENIPUNCTURE: CPT | Mod: PO

## 2019-05-01 ENCOUNTER — TELEPHONE (OUTPATIENT)
Dept: CARDIOLOGY | Facility: CLINIC | Age: 54
End: 2019-05-01

## 2019-05-01 NOTE — TELEPHONE ENCOUNTER
----- Message from Liza Manning sent at 5/1/2019 11:30 AM CDT -----  Contact: [phone]  Patient states Dr. Thomas changed her dosage for candesartan (ATACAND) 16 MG tablet  To 2 pills a day. She needs a refill that will reflect this. [facility] [place].

## 2019-05-20 ENCOUNTER — OFFICE VISIT (OUTPATIENT)
Dept: CARDIOLOGY | Facility: CLINIC | Age: 54
End: 2019-05-20
Payer: MEDICAID

## 2019-05-20 VITALS
OXYGEN SATURATION: 98 % | HEART RATE: 75 BPM | DIASTOLIC BLOOD PRESSURE: 86 MMHG | WEIGHT: 177.81 LBS | HEIGHT: 62 IN | BODY MASS INDEX: 32.72 KG/M2 | SYSTOLIC BLOOD PRESSURE: 150 MMHG

## 2019-05-20 DIAGNOSIS — I42.0 CARDIOMYOPATHY, DILATED: ICD-10-CM

## 2019-05-20 DIAGNOSIS — I10 ESSENTIAL HYPERTENSION: ICD-10-CM

## 2019-05-20 DIAGNOSIS — I50.20 NYHA CLASS 2 HEART FAILURE WITH REDUCED EJECTION FRACTION: ICD-10-CM

## 2019-05-20 DIAGNOSIS — I42.8 NONISCHEMIC CARDIOMYOPATHY: Primary | ICD-10-CM

## 2019-05-20 PROCEDURE — 99999 PR PBB SHADOW E&M-EST. PATIENT-LVL III: CPT | Mod: PBBFAC,,, | Performed by: INTERNAL MEDICINE

## 2019-05-20 PROCEDURE — 99999 PR PBB SHADOW E&M-EST. PATIENT-LVL III: ICD-10-PCS | Mod: PBBFAC,,, | Performed by: INTERNAL MEDICINE

## 2019-05-20 PROCEDURE — 99215 OFFICE O/P EST HI 40 MIN: CPT | Mod: S$PBB,,, | Performed by: INTERNAL MEDICINE

## 2019-05-20 PROCEDURE — 99215 PR OFFICE/OUTPT VISIT, EST, LEVL V, 40-54 MIN: ICD-10-PCS | Mod: S$PBB,,, | Performed by: INTERNAL MEDICINE

## 2019-05-20 PROCEDURE — 99213 OFFICE O/P EST LOW 20 MIN: CPT | Mod: PBBFAC,PN | Performed by: INTERNAL MEDICINE

## 2019-05-20 RX ORDER — CANDESARTAN 16 MG/1
TABLET ORAL
Refills: 3 | COMMUNITY
Start: 2019-04-22 | End: 2019-05-20 | Stop reason: ALTCHOICE

## 2019-05-20 RX ORDER — CARVEDILOL 12.5 MG/1
12.5 TABLET ORAL 2 TIMES DAILY WITH MEALS
Qty: 60 TABLET | Refills: 11 | Status: SHIPPED | OUTPATIENT
Start: 2019-05-20 | End: 2020-07-22

## 2019-05-20 NOTE — PROGRESS NOTES
"[facility]    Chief Complaint   Patient presents with    Hypertension       Patient ID: Corrine Malik is a 54 y.o. female with a past medical history of fibroids s/p total abdominal hysterectomy w/ bilateral salpingoophorectomy, who presents for a follow up appointment.  Pertinent history/events are as follows:     -On 1/3/2019, pt presented to [facility] with SOB x 10 days.  Found to have new onset chf.  Echo from 1/4/2019 showed   · Severely decreased left ventricular systolic function. The estimated ejection fraction is 15%  · Left ventricular diastolic dysfunction.  · Moderate left ventricular enlargement.  · Normal right ventricular systolic function.  · The estimated PA systolic pressure is 21 mm Hg  ·   EKG from 1/3/2019 showed Sinus tachycardia with Left bundle branch block (present on EKG's since 9/13/2018).  Pt discharged on carvedilol 3.125 mg bid and lisinopril 20 mg daily.      -At our initial clinic visit on 1/23/2019, Mrs. Malik reported SOB on exertion since hospital discharge.  States sometimes when she eats, her stomach "swells".  Complains of severe cough since starting lisinopril.  She has no chest pain or LE edema.  Blood pressure today is 134/90.   Plan: Pt with NYHA class III symptoms.  Etiology unclear.  Review of EKG's show pt developed a LBBB between 12/23/2016 and 9/13/2018.  Discontinue lisinopril due to cough and start candesartan 16 mg daily.  Continue carvedilol 3.125 mg bid.  Start lasix 40 mg daily.  Schedule for right and left heart cath to evaluate further on 1/31/2019.  Arrange for Life Vest.    -on 1/31/2019, Right heart cath was performed via the right IJ vein and revealed:     RA-18/15 (16)  RV- 57/14 (19)  PA 58/32 (43)  PCW 41/42 (38)     CO 4.1  CI 2.25     Left heart cath was performed via the right radial artery and revealed:     Left Main- normal  LAD- normal  LCx- normal  RCA- small, normal     Findings consistent with non-ischemic " cardiomyopathy with significantly elevated right heart pressures.  Low cardiac output and index.       Plan:  -routine post cath care  -increase lasix to 40 mg bid  -continue Life Vest  -follow up in Cardiology clinic in 1 week    -At follow up clinic visit on 2/18/2019, Mrs. Malik reports doing much better since clinic visit on 1/23/2019.  SOB with exertion now significantly improved.  Cough resolved since stopping lisinopril.  Pt states she was unable to tolerate wearing the Life Vest all the time because of back pain.  States she only wears it at night.   Plan: Pt with non-ischemic cardiomyopathy with NYHA class II symptoms.  Discontinue candesartan 16 mg daily and start Entresto 24/26 mg bid.  Continue carvedilol 3.125 mg bid and lasix 40 mg daily.  Pt to monitor blood pressure on this regimen.    -At clinic visit on 4/17/2019, Mrs. Malik reports no significant SOB or LE edema.  Appears well compensated on exam with clear lungs and no elevated JVD.  Blood pressure today is 143/82.    Plan: Pt with non-ischemic cardiomyopathy with NYHA class II symptoms.  Check cmp today.  If appropriate, increase Entresto to 49/51 mg bid.  Repeat cmp 1 week after starting increased dose of Entresto.  Increase carvedilol to 6.25 mg bid and lasix 40 mg daily.      HPI:  Mrs. Malik reports doing well with no chest pain, significant SOB or LE edema.  Tolerated Entresto 49/51 mg bid with no issues.      Past Medical History:   Diagnosis Date    Cardiomyopathy     CHF (congestive heart failure)     Hypertension      Past Surgical History:   Procedure Laterality Date    CATHETERIZATION, HEART, BOTH LEFT AND RIGHT N/A 1/31/2019    Performed by [facility] De [facility] MD [facility] at [facility] C[facility]    HYSTERECTOMY, TOTAL, ABDOMINAL, WITH BILATERAL SALPINGO-OOPHORECTOMY N/A 11/12/2018    Performed by [facility]    REMOVAL-MASS (VAGINAL) N/A 11/12/2018    [facility] at [facility] OR    tubal ligation       VAGINAL DELIVERY      x 5     Social History     Socioeconomic History    Marital status: Single     Spouse name: Not on file    Number of children: Not on file    Years of education: Not on file    Highest education level: Not on file   Occupational History    Not on file   Social Needs    Financial resource strain: Not on file    Food insecurity:     Worry: Not on file     Inability: Not on file    Transportation needs:     Medical: Not on file     Non-medical: Not on file   Tobacco Use    Smoking status: Never Smoker    Smokeless tobacco: Never Used   Substance and Sexual Activity    Alcohol use: No    Drug use: No    Sexual activity: Not on file   Lifestyle    Physical activity:     Days per week: Not on file     Minutes per session: Not on file    Stress: Not on file   Relationships    Social connections:     Talks on phone: Not on file     Gets together: Not on file     Attends [employer] service: Not on file     Active member of club or organization: Not on file     Attends meetings of clubs or organizations: Not on file     Relationship status: Not on file   Other Topics Concern    Not on file   Social History Narrative    Not on file     Family History   Problem Relation Age of Onset    Hypertension Mother     Stroke Mother     Kidney failure Father     Hypertension Father        Review of patient's allergies indicates:   Allergen Reactions    Peas Swelling       Medication List with Changes/Refills   Current Medications    CANDESARTAN (ATACAND) 16 MG TABLET        CARVEDILOL (COREG) 3.125 MG TABLET    Take 2 tablets (6.25 mg total) by mouth 2 (two) times daily.    FUROSEMIDE (LASIX) 40 MG TABLET    Take 1 tablet (40 mg total) by mouth 2 (two) times daily.    SACUBITRIL-VALSARTAN (ENTRESTO) 49-51 MG PER TABLET    Take 1 tablet by mouth 2 (two) times daily.       Review of Systems  Constitution: Denies chills, fever, and sweats.  HENT: Denies headaches or blurry vision.  Cardiovascular:  "Denies chest pain or irregular heart beat.  Respiratory: Positive for shortness of breath on exertion.  Gastrointestinal: Denies abdominal pain, nausea, or vomiting.  Musculoskeletal: Denies muscle cramps.  Neurological: Denies dizziness or focal weakness.  Psychiatric/Behavioral: Normal mental status.  Hematologic/Lymphatic: Denies bleeding problem or easy bruising/bleeding.  Skin: Denies rash or suspicious lesions    Physical Examination  BP (!) 150/86 (BP Location: Left arm, Patient Position: Sitting, BP Method: Large (Manual))   Pulse 75   Ht 5' 2" (1.575 m)   Wt 80.6 kg (177 lb 12.8 oz)   LMP 05/04/2018 Comment: Fibroids bleeding.  SpO2 98%   BMI 32.52 kg/m²     Constitutional: No acute distress, conversant  HEENT: Sclera anicteric, Pupils equal, round and reactive to light, extraocular motions intact, Oropharynx clear  Neck: No JVD, no carotid bruits  Cardiovascular: regular rate and rhythm, no murmur, rubs or gallops, normal S1/S2  Pulmonary: Clear to auscultation bilaterally  Abdominal: Abdomen soft, nontender, nondistended, positive bowel sounds  Extremities: No lower extremity edema,   Pulses:  Carotid pulses are 2+ on the right side, and 2+ on the left side.  Radial pulses are 2+ on the right side, and 2+ on the left side.   Femoral pulses are 2+ on the right side, and 2+ on the left side.  Skin: No ecchymosis, erythema, or ulcers  Psych: Alert and oriented x 3, appropriate affect  Neuro: CNII-XII intact, no focal deficits    Labs:  Most Recent Data  CBC:   Lab Results   Component Value Date    WBC 7.99 01/28/2019    HGB 9.6 (L) 01/28/2019    HCT 33.0 (L) 01/28/2019     01/28/2019    MCV 71 (L) 01/28/2019    RDW 20.0 (H) 01/28/2019     BMP:   Lab Results   Component Value Date     04/30/2019    K 3.8 04/30/2019     04/30/2019    CO2 30 (H) 04/30/2019    BUN 13 04/30/2019    CREATININE 0.75 04/30/2019     04/30/2019    CALCIUM 9.6 04/30/2019    MG 2.1 01/04/2019     LFTS; "   Lab Results   Component Value Date    PROT 8.0 04/30/2019    ALBUMIN 4.2 04/30/2019    BILITOT 0.5 04/30/2019    AST 24 04/30/2019    ALKPHOS 87 04/30/2019    ALT 15 04/30/2019     COAGS:   Lab Results   Component Value Date    INR 1.3 (H) 01/28/2019     FLP:   Lab Results   Component Value Date    CHOL 156 09/13/2018    HDL 38 (L) 09/13/2018    LDLCALC 102.6 09/13/2018    TRIG 77 09/13/2018    CHOLHDL 24.4 09/13/2018     CARDIAC:   Lab Results   Component Value Date    TROPONINI 0.014 01/04/2019     (H) 01/03/2019       EKG 1/3/2019:  Sinus tachycardia  Left bundle branch block    Echo 1/4/2019:  · Severely decreased left ventricular systolic function. The estimated ejection fraction is 15%  · Left ventricular diastolic dysfunction.  · Moderate left ventricular enlargement.  · Normal right ventricular systolic function.  · The estimated PA systolic pressure is 21 mm Hg      Assessment/Plan:  Corrine Malik is a 54 y.o. female with a past medical history of new onset CHF (EF 15%), fibroids s/p total abdominal hysterectomy w/ bilateral salpingoophorectomy, who presents for a follow up appointment.      1. New Onset CHF/Dilated Cardiomyopathy- Pt with non-ischemic cardiomyopathy with NYHA class II symptoms.  Check cmp today.  If appropriate, increase Entresto to 97/103 mg bid.  Repeat cmp 1 week after starting increased dose of Entresto.  Increase carvedilol to 12.5 mg bid.  Continue lasix 40 mg daily.      2. HTN- Pt to monitor blood pressure on above regimen.        Follow up in 4 weeks    Total duration of face to face visit time 30 minutes.  Total time spent counseling greater than fifty percent of total visit time.  Counseling included discussion regarding imaging findings, diagnosis, possibilities, treatment options, risks and benefits.  The patient had many questions regarding the options and long-term effects.    Jose De Jesus Thomas MD, PhD  Interventional Cardiology

## 2019-05-20 NOTE — PATIENT INSTRUCTIONS
Assessment/Plan:  Corrine Malik is a 54 y.o. female with a past medical history of new onset CHF (EF 15%), fibroids s/p total abdominal hysterectomy w/ bilateral salpingoophorectomy, who presents for a follow up appointment.      1. New Onset CHF/Dilated Cardiomyopathy- Pt with non-ischemic cardiomyopathy with NYHA class II symptoms.  Check cmp today.  If appropriate, increase Entresto to 97/103 mg bid.  Repeat cmp 1 week after starting increased dose of Entresto.  Increase carvedilol to 12.5 mg bid.  Continue lasix 40 mg daily.      2. HTN- Pt to monitor blood pressure on above regimen.        Follow up in 4 weeks

## 2019-05-21 ENCOUNTER — LAB VISIT (OUTPATIENT)
Dept: LAB | Facility: HOSPITAL | Age: 54
End: 2019-05-21
Attending: INTERNAL MEDICINE
Payer: MEDICAID

## 2019-05-21 DIAGNOSIS — I42.8 NONISCHEMIC CARDIOMYOPATHY: ICD-10-CM

## 2019-05-21 DIAGNOSIS — I10 ESSENTIAL HYPERTENSION: ICD-10-CM

## 2019-05-21 DIAGNOSIS — I50.20 NYHA CLASS 2 HEART FAILURE WITH REDUCED EJECTION FRACTION: ICD-10-CM

## 2019-05-21 DIAGNOSIS — I42.0 CARDIOMYOPATHY, DILATED: ICD-10-CM

## 2019-05-21 LAB
ALBUMIN SERPL BCP-MCNC: 4 G/DL (ref 3.5–5.2)
ALP SERPL-CCNC: 91 U/L (ref 38–126)
ALT SERPL W/O P-5'-P-CCNC: 16 U/L (ref 10–44)
ANION GAP SERPL CALC-SCNC: 9 MMOL/L (ref 8–16)
AST SERPL-CCNC: 21 U/L (ref 15–46)
BILIRUB SERPL-MCNC: 0.3 MG/DL (ref 0.1–1)
BUN SERPL-MCNC: 12 MG/DL (ref 7–17)
CALCIUM SERPL-MCNC: 9.4 MG/DL (ref 8.7–10.5)
CHLORIDE SERPL-SCNC: 103 MMOL/L (ref 95–110)
CO2 SERPL-SCNC: 30 MMOL/L (ref 23–29)
CREAT SERPL-MCNC: 0.72 MG/DL (ref 0.5–1.4)
EST. GFR  (AFRICAN AMERICAN): >60 ML/MIN/1.73 M^2
EST. GFR  (NON AFRICAN AMERICAN): >60 ML/MIN/1.73 M^2
GLUCOSE SERPL-MCNC: 124 MG/DL (ref 70–110)
POTASSIUM SERPL-SCNC: 3.3 MMOL/L (ref 3.5–5.1)
PROT SERPL-MCNC: 7.5 G/DL (ref 6–8.4)
SODIUM SERPL-SCNC: 142 MMOL/L (ref 136–145)

## 2019-05-21 PROCEDURE — 36415 COLL VENOUS BLD VENIPUNCTURE: CPT | Mod: PO

## 2019-05-21 PROCEDURE — 80053 COMPREHEN METABOLIC PANEL: CPT | Mod: PO

## 2019-06-21 ENCOUNTER — OFFICE VISIT (OUTPATIENT)
Dept: CARDIOLOGY | Facility: CLINIC | Age: 54
End: 2019-06-21
Payer: MEDICAID

## 2019-06-21 VITALS
SYSTOLIC BLOOD PRESSURE: 144 MMHG | DIASTOLIC BLOOD PRESSURE: 82 MMHG | HEART RATE: 57 BPM | WEIGHT: 179.63 LBS | OXYGEN SATURATION: 97 % | HEIGHT: 62 IN | BODY MASS INDEX: 33.06 KG/M2

## 2019-06-21 DIAGNOSIS — I50.20 NYHA CLASS 2 HEART FAILURE WITH REDUCED EJECTION FRACTION: ICD-10-CM

## 2019-06-21 DIAGNOSIS — I42.0 CARDIOMYOPATHY, DILATED: Primary | ICD-10-CM

## 2019-06-21 DIAGNOSIS — I42.8 NONISCHEMIC CARDIOMYOPATHY: ICD-10-CM

## 2019-06-21 DIAGNOSIS — I50.20 SYSTOLIC CONGESTIVE HEART FAILURE, UNSPECIFIED HF CHRONICITY: ICD-10-CM

## 2019-06-21 PROCEDURE — 99213 OFFICE O/P EST LOW 20 MIN: CPT | Mod: PBBFAC,PN | Performed by: INTERNAL MEDICINE

## 2019-06-21 PROCEDURE — 99215 PR OFFICE/OUTPT VISIT, EST, LEVL V, 40-54 MIN: ICD-10-PCS | Mod: S$PBB,,, | Performed by: INTERNAL MEDICINE

## 2019-06-21 PROCEDURE — 99215 OFFICE O/P EST HI 40 MIN: CPT | Mod: S$PBB,,, | Performed by: INTERNAL MEDICINE

## 2019-06-21 PROCEDURE — 99999 PR PBB SHADOW E&M-EST. PATIENT-LVL III: ICD-10-PCS | Mod: PBBFAC,,, | Performed by: INTERNAL MEDICINE

## 2019-06-21 PROCEDURE — 99999 PR PBB SHADOW E&M-EST. PATIENT-LVL III: CPT | Mod: PBBFAC,,, | Performed by: INTERNAL MEDICINE

## 2019-06-21 RX ORDER — SPIRONOLACTONE 25 MG/1
25 TABLET ORAL DAILY
Qty: 30 TABLET | Refills: 11 | Status: SHIPPED | OUTPATIENT
Start: 2019-06-21 | End: 2020-07-09

## 2019-06-21 NOTE — PROGRESS NOTES
"[facility]    Chief Complaint   Patient presents with    Follow-up       Patient ID: Corrine Malik is a 54 y.o. female with a past medical history of fibroids s/p total abdominal hysterectomy w/ bilateral salpingoophorectomy, who presents for a follow up appointment.  Pertinent history/events are as follows:     -On 1/3/2019, pt presented to [facility] with SOB x 10 days.  Found to have new onset chf.  Echo from 1/4/2019 showed   · Severely decreased left ventricular systolic function. The estimated ejection fraction is 15%  · Left ventricular diastolic dysfunction.  · Moderate left ventricular enlargement.  · Normal right ventricular systolic function.  · The estimated PA systolic pressure is 21 mm Hg  ·   EKG from 1/3/2019 showed Sinus tachycardia with Left bundle branch block (present on EKG's since 9/13/2018).  Pt discharged on carvedilol 3.125 mg bid and lisinopril 20 mg daily.      -At our initial clinic visit on 1/23/2019, Mrs. Malik reported SOB on exertion since hospital discharge.  States sometimes when she eats, her stomach "swells".  Complains of severe cough since starting lisinopril.  She has no chest pain or LE edema.  Blood pressure today is 134/90.   Plan: Pt with NYHA class III symptoms.  Etiology unclear.  Review of EKG's show pt developed a LBBB between 12/23/2016 and 9/13/2018.  Discontinue lisinopril due to cough and start candesartan 16 mg daily.  Continue carvedilol 3.125 mg bid.  Start lasix 40 mg daily.  Schedule for right and left heart cath to evaluate further on 1/31/2019.  Arrange for Life Vest.    -on 1/31/2019, Right heart cath was performed via the right IJ vein and revealed:     RA-18/15 (16)  RV- 57/14 (19)  PA 58/32 (43)  PCW 41/42 (38)     CO 4.1  CI 2.25     Left heart cath was performed via the right radial artery and revealed:     Left Main- normal  LAD- normal  LCx- normal  RCA- small, normal     Findings consistent with non-ischemic " cardiomyopathy with significantly elevated right heart pressures.  Low cardiac output and index.       Plan:  -routine post cath care  -increase lasix to 40 mg bid  -continue Life Vest  -follow up in Cardiology clinic in 1 week    -At follow up clinic visit on 2/18/2019, Mrs. Malik reports doing much better since clinic visit on 1/23/2019.  SOB with exertion now significantly improved.  Cough resolved since stopping lisinopril.  Pt states she was unable to tolerate wearing the Life Vest all the time because of back pain.  States she only wears it at night.   Plan: Pt with non-ischemic cardiomyopathy with NYHA class II symptoms.  Discontinue candesartan 16 mg daily and start Entresto 24/26 mg bid.  Continue carvedilol 3.125 mg bid and lasix 40 mg daily.  Pt to monitor blood pressure on this regimen.    -At clinic visit on 4/17/2019, Mrs. Malik reports no significant SOB or LE edema.  Appears well compensated on exam with clear lungs and no elevated JVD.  Blood pressure today is 143/82.    Plan: Pt with non-ischemic cardiomyopathy with NYHA class II symptoms.  Check cmp today.  If appropriate, increase Entresto to 49/51 mg bid.  Repeat cmp 1 week after starting increased dose of Entresto.  Increase carvedilol to 6.25 mg bid and lasix 40 mg daily.      -At follow up clinic visit on 5/20/2019, Mrs. Malik reported doing well with no chest pain, significant SOB or LE edema.  Tolerated Entresto 49/51 mg bid with no issues.    Plan:   New Onset CHF/Dilated Cardiomyopathy- Pt with non-ischemic cardiomyopathy with NYHA class II symptoms.  Check cmp today.  If appropriate, increase Entresto to 97/103 mg bid.  Repeat cmp 1 week after starting increased dose of Entresto.  Increase carvedilol to 12.5 mg bid.  Continue lasix 40 mg daily.    HTN- Pt to monitor blood pressure on above regimen.        HPI:  Mrs. Malik reports doing well with no chest pain, SOB, or LE edema.  Tolerating Entresto 97/103 mg bid.  Pt states she  returned the Life Vest because it was uncomfortable.      Past Medical History:   Diagnosis Date    Cardiomyopathy     CHF (congestive heart failure)     Hypertension      Past Surgical History:   Procedure Laterality Date    CATHETERIZATION, HEART, BOTH LEFT AND RIGHT N/A 1/31/2019    Performed by Jose De [facility] Thomas MD PhD at [facility] CATH LAB    HYSTERECTOMY, TOTAL, ABDOMINAL, WITH BILATERAL SALPINGO-OOPHORECTOMY N/A 11/12/2018    Performed by [facility]ier [facility] OR    REMOVAL-MASS (VAGINAL) N/A 11/12/2018    Performed by [facility]ier [facility] at [facility] OR    tubal ligation      VAGINAL DELIVERY      x 5     Social History     Socioeconomic History    Marital status: Single     Spouse name: Not on file    Number of children: Not on file    Years of education: Not on file    Highest education level: Not on file   Occupational History    Not on file   Social Needs    Financial resource strain: Not on file    Food insecurity:     Worry: Not on file     Inability: Not on file    Transportation needs:     Medical: Not on file     Non-medical: Not on file   Tobacco Use    Smoking status: Never Smoker    Smokeless tobacco: Never Used   Substance and Sexual Activity    Alcohol use: No    Drug use: No    Sexual activity: Yes     Partners: Male   Lifestyle    Physical activity:     Days per week: Not on file     Minutes per session: Not on file    Stress: Not on file   Relationships    Social connections:     Talks on phone: Not on file     Gets together: Not on file     Attends [employer] service: Not on file     Active member of club or organization: Not on file     Attends meetings of clubs or organizations: Not on file     Relationship status: Not on file   Other Topics Concern    Not on file   Social History Narrative    Not on file     Family History   Problem Relation Age of Onset    Hypertension Mother     Stroke Mother     Kidney failure Father     Hypertension Father        Review of  "patient's allergies indicates:   Allergen Reactions    Peas Swelling       Medication List with Changes/Refills   Current Medications    CARVEDILOL (COREG) 12.5 MG TABLET    Take 1 tablet (12.5 mg total) by mouth 2 (two) times daily with meals.    FUROSEMIDE (LASIX) 40 MG TABLET    Take 1 tablet (40 mg total) by mouth 2 (two) times daily.    SACUBITRIL-VALSARTAN (ENTRESTO)  MG PER TABLET    Take 1 tablet by mouth 2 (two) times daily.       Review of Systems  Constitution: Denies chills, fever, and sweats.  HENT: Denies headaches or blurry vision.  Cardiovascular: Denies chest pain or irregular heart beat.  Respiratory: Positive for shortness of breath on exertion.  Gastrointestinal: Denies abdominal pain, nausea, or vomiting.  Musculoskeletal: Denies muscle cramps.  Neurological: Denies dizziness or focal weakness.  Psychiatric/Behavioral: Normal mental status.  Hematologic/Lymphatic: Denies bleeding problem or easy bruising/bleeding.  Skin: Denies rash or suspicious lesions    Physical Examination  BP (!) 144/82 (BP Location: Left arm, Patient Position: Sitting, BP Method: Large (Manual))   Pulse (!) 57   Ht 5' 2" (1.575 m)   Wt 81.5 kg (179 lb 9.6 oz)   LMP 05/04/2018 Comment: Fibroids bleeding.  SpO2 97%   BMI 32.85 kg/m²     Constitutional: No acute distress, conversant  HEENT: Sclera anicteric, Pupils equal, round and reactive to light, extraocular motions intact, Oropharynx clear  Neck: No JVD, no carotid bruits  Cardiovascular: regular rate and rhythm, no murmur, rubs or gallops, normal S1/S2  Pulmonary: Clear to auscultation bilaterally  Abdominal: Abdomen soft, nontender, nondistended, positive bowel sounds  Extremities: No lower extremity edema,   Pulses:  Carotid pulses are 2+ on the right side, and 2+ on the left side.  Radial pulses are 2+ on the right side, and 2+ on the left side.   Femoral pulses are 2+ on the right side, and 2+ on the left side.  Skin: No ecchymosis, erythema, or " ulcers  Psych: Alert and oriented x 3, appropriate affect  Neuro: CNII-XII intact, no focal deficits    Labs:  Most Recent Data  CBC:   Lab Results   Component Value Date    WBC 7.99 01/28/2019    HGB 9.6 (L) 01/28/2019    HCT 33.0 (L) 01/28/2019     01/28/2019    MCV 71 (L) 01/28/2019    RDW 20.0 (H) 01/28/2019     BMP:   Lab Results   Component Value Date     05/21/2019    K 3.3 (L) 05/21/2019     05/21/2019    CO2 30 (H) 05/21/2019    BUN 12 05/21/2019    CREATININE 0.72 05/21/2019     (H) 05/21/2019    CALCIUM 9.4 05/21/2019    MG 2.1 01/04/2019     LFTS;   Lab Results   Component Value Date    PROT 7.5 05/21/2019    ALBUMIN 4.0 05/21/2019    BILITOT 0.3 05/21/2019    AST 21 05/21/2019    ALKPHOS 91 05/21/2019    ALT 16 05/21/2019     COAGS:   Lab Results   Component Value Date    INR 1.3 (H) 01/28/2019     FLP:   Lab Results   Component Value Date    CHOL 156 09/13/2018    HDL 38 (L) 09/13/2018    LDLCALC 102.6 09/13/2018    TRIG 77 09/13/2018    CHOLHDL 24.4 09/13/2018     CARDIAC:   Lab Results   Component Value Date    TROPONINI 0.014 01/04/2019     (H) 01/03/2019       EKG 1/3/2019:  Sinus tachycardia  Left bundle branch block    Echo 1/4/2019:  · Severely decreased left ventricular systolic function. The estimated ejection fraction is 15%  · Left ventricular diastolic dysfunction.  · Moderate left ventricular enlargement.  · Normal right ventricular systolic function.  · The estimated PA systolic pressure is 21 mm Hg      Assessment/Plan:  Corrine Malik is a 54 y.o. female with a past medical history of new onset CHF (EF 15%), fibroids s/p total abdominal hysterectomy w/ bilateral salpingoophorectomy, who presents for a follow up appointment.      1. New Onset CHF/Dilated Cardiomyopathy- Pt with non-ischemic cardiomyopathy with NYHA class II symptoms.  Check cmp today.  If appropriate, start spironolactone 25 mg daily.  Repeat cmp in 1 week and 1 month.  Continue  Entresto to 97/103 mg bid; carvedilol to 12.5 mg bid; lasix 40 mg daily.      2. HTN- Pt to monitor blood pressure on above regimen.        Follow up in 4 weeks    Total duration of face to face visit time 30 minutes.  Total time spent counseling greater than fifty percent of total visit time.  Counseling included discussion regarding imaging findings, diagnosis, possibilities, treatment options, risks and benefits.  The patient had many questions regarding the options and long-term effects.    Jose De Jesus Thomas MD, PhD  Interventional Cardiology

## 2019-06-21 NOTE — PATIENT INSTRUCTIONS
Assessment/Plan:  Corrine Malik is a 54 y.o. female with a past medical history of new onset CHF (EF 15%), fibroids s/p total abdominal hysterectomy w/ bilateral salpingoophorectomy, who presents for a follow up appointment.      1. New Onset CHF/Dilated Cardiomyopathy- Pt with non-ischemic cardiomyopathy with NYHA class II symptoms.  Check cmp today.  If appropriate, start spironolactone 25 mg daily.  Repeat cmp in 1 week and 1 month.  Continue Entresto to 97/103 mg bid; carvedilol to 12.5 mg bid; lasix 40 mg daily.      2. HTN- Pt to monitor blood pressure on above regimen.        Follow up in 4 weeks

## 2019-07-02 ENCOUNTER — LAB VISIT (OUTPATIENT)
Dept: LAB | Facility: HOSPITAL | Age: 54
End: 2019-07-02
Attending: INTERNAL MEDICINE
Payer: MEDICAID

## 2019-07-02 DIAGNOSIS — I42.8 NONISCHEMIC CARDIOMYOPATHY: ICD-10-CM

## 2019-07-02 DIAGNOSIS — I50.20 SYSTOLIC CONGESTIVE HEART FAILURE, UNSPECIFIED HF CHRONICITY: ICD-10-CM

## 2019-07-02 DIAGNOSIS — I50.20 NYHA CLASS 2 HEART FAILURE WITH REDUCED EJECTION FRACTION: ICD-10-CM

## 2019-07-02 DIAGNOSIS — I42.0 CARDIOMYOPATHY, DILATED: ICD-10-CM

## 2019-07-02 LAB
ALBUMIN SERPL BCP-MCNC: 4.4 G/DL (ref 3.5–5.2)
ALP SERPL-CCNC: 89 U/L (ref 38–126)
ALT SERPL W/O P-5'-P-CCNC: 15 U/L (ref 10–44)
ANION GAP SERPL CALC-SCNC: 8 MMOL/L (ref 8–16)
AST SERPL-CCNC: 22 U/L (ref 15–46)
BILIRUB SERPL-MCNC: 0.6 MG/DL (ref 0.1–1)
BUN SERPL-MCNC: 22 MG/DL (ref 7–17)
CALCIUM SERPL-MCNC: 10.1 MG/DL (ref 8.7–10.5)
CHLORIDE SERPL-SCNC: 101 MMOL/L (ref 95–110)
CO2 SERPL-SCNC: 32 MMOL/L (ref 23–29)
CREAT SERPL-MCNC: 0.95 MG/DL (ref 0.5–1.4)
EST. GFR  (AFRICAN AMERICAN): >60 ML/MIN/1.73 M^2
EST. GFR  (NON AFRICAN AMERICAN): >60 ML/MIN/1.73 M^2
GLUCOSE SERPL-MCNC: 116 MG/DL (ref 70–110)
POTASSIUM SERPL-SCNC: 4.2 MMOL/L (ref 3.5–5.1)
PROT SERPL-MCNC: 8.4 G/DL (ref 6–8.4)
SODIUM SERPL-SCNC: 141 MMOL/L (ref 136–145)

## 2019-07-02 PROCEDURE — 80053 COMPREHEN METABOLIC PANEL: CPT | Mod: PO

## 2019-07-02 PROCEDURE — 36415 COLL VENOUS BLD VENIPUNCTURE: CPT | Mod: PO

## 2019-07-23 ENCOUNTER — OFFICE VISIT (OUTPATIENT)
Dept: CARDIOLOGY | Facility: CLINIC | Age: 54
End: 2019-07-23
Payer: MEDICAID

## 2019-07-23 VITALS
WEIGHT: 181.19 LBS | HEIGHT: 62 IN | OXYGEN SATURATION: 98 % | HEART RATE: 85 BPM | BODY MASS INDEX: 33.34 KG/M2 | SYSTOLIC BLOOD PRESSURE: 121 MMHG | DIASTOLIC BLOOD PRESSURE: 80 MMHG

## 2019-07-23 DIAGNOSIS — I42.0 CARDIOMYOPATHY, DILATED: ICD-10-CM

## 2019-07-23 DIAGNOSIS — I50.20 NYHA CLASS 2 HEART FAILURE WITH REDUCED EJECTION FRACTION: ICD-10-CM

## 2019-07-23 DIAGNOSIS — I10 ESSENTIAL HYPERTENSION: ICD-10-CM

## 2019-07-23 DIAGNOSIS — I50.42 CHRONIC COMBINED SYSTOLIC AND DIASTOLIC CONGESTIVE HEART FAILURE: ICD-10-CM

## 2019-07-23 DIAGNOSIS — I42.8 NONISCHEMIC CARDIOMYOPATHY: Primary | ICD-10-CM

## 2019-07-23 PROCEDURE — 99999 PR PBB SHADOW E&M-EST. PATIENT-LVL III: CPT | Mod: PBBFAC,,, | Performed by: INTERNAL MEDICINE

## 2019-07-23 PROCEDURE — 99999 PR PBB SHADOW E&M-EST. PATIENT-LVL III: ICD-10-PCS | Mod: PBBFAC,,, | Performed by: INTERNAL MEDICINE

## 2019-07-23 PROCEDURE — 99215 PR OFFICE/OUTPT VISIT, EST, LEVL V, 40-54 MIN: ICD-10-PCS | Mod: S$PBB,,, | Performed by: INTERNAL MEDICINE

## 2019-07-23 PROCEDURE — 99215 OFFICE O/P EST HI 40 MIN: CPT | Mod: S$PBB,,, | Performed by: INTERNAL MEDICINE

## 2019-07-23 PROCEDURE — 99213 OFFICE O/P EST LOW 20 MIN: CPT | Mod: PBBFAC,PN | Performed by: INTERNAL MEDICINE

## 2019-07-23 NOTE — PROGRESS NOTES
"[facility]    Chief Complaint   Patient presents with    Follow-up     labs       Patient ID: Corrine Malik is a 54 y.o. female with a past medical history of fibroids s/p total abdominal hysterectomy w/ bilateral salpingoophorectomy, who presents for a follow up appointment.  Pertinent history/events are as follows:     -On 1/3/2019, pt presented to [facility] with SOB x 10 days.  Found to have new onset chf.  Echo from 1/4/2019 showed   · Severely decreased left ventricular systolic function. The estimated ejection fraction is 15%  · Left ventricular diastolic dysfunction.  · Moderate left ventricular enlargement.  · Normal right ventricular systolic function.  · The estimated PA systolic pressure is 21 mm Hg  ·   EKG from 1/3/2019 showed Sinus tachycardia with Left bundle branch block (present on EKG's since 9/13/2018).  Pt discharged on carvedilol 3.125 mg bid and lisinopril 20 mg daily.      -At our initial clinic visit on 1/23/2019, Mrs. Malik reported SOB on exertion since hospital discharge.  States sometimes when she eats, her stomach "swells".  Complains of severe cough since starting lisinopril.  She has no chest pain or LE edema.  Blood pressure today is 134/90.   Plan: Pt with NYHA class III symptoms.  Etiology unclear.  Review of EKG's show pt developed a LBBB between 12/23/2016 and 9/13/2018.  Discontinue lisinopril due to cough and start candesartan 16 mg daily.  Continue carvedilol 3.125 mg bid.  Start lasix 40 mg daily.  Schedule for right and left heart cath to evaluate further on 1/31/2019.  Arrange for Life Vest.    -on 1/31/2019, Right heart cath was performed via the right IJ vein and revealed:     RA-18/15 (16)  RV- 57/14 (19)  PA 58/32 (43)  PCW 41/42 (38)     CO 4.1  CI 2.25     Left heart cath was performed via the right radial artery and revealed:     Left Main- normal  LAD- normal  LCx- normal  RCA- small, normal     Findings consistent with non-ischemic " cardiomyopathy with significantly elevated right heart pressures.  Low cardiac output and index.       Plan:  -routine post cath care  -increase lasix to 40 mg bid  -continue Life Vest  -follow up in Cardiology clinic in 1 week    -At follow up clinic visit on 2/18/2019, Mrs. Malik reports doing much better since clinic visit on 1/23/2019.  SOB with exertion now significantly improved.  Cough resolved since stopping lisinopril.  Pt states she was unable to tolerate wearing the Life Vest all the time because of back pain.  States she only wears it at night.   Plan: Pt with non-ischemic cardiomyopathy with NYHA class II symptoms.  Discontinue candesartan 16 mg daily and start Entresto 24/26 mg bid.  Continue carvedilol 3.125 mg bid and lasix 40 mg daily.  Pt to monitor blood pressure on this regimen.    -At clinic visit on 4/17/2019, Mrs. Malik reports no significant SOB or LE edema.  Appears well compensated on exam with clear lungs and no elevated JVD.  Blood pressure today is 143/82.    Plan: Pt with non-ischemic cardiomyopathy with NYHA class II symptoms.  Check cmp today.  If appropriate, increase Entresto to 49/51 mg bid.  Repeat cmp 1 week after starting increased dose of Entresto.  Increase carvedilol to 6.25 mg bid and lasix 40 mg daily.      -At follow up clinic visit on 5/20/2019, Mrs. Malik reported doing well with no chest pain, significant SOB or LE edema.  Tolerated Entresto 49/51 mg bid with no issues.    Plan:   New Onset CHF/Dilated Cardiomyopathy- Pt with non-ischemic cardiomyopathy with NYHA class II symptoms.  Check cmp today.  If appropriate, increase Entresto to 97/103 mg bid.  Repeat cmp 1 week after starting increased dose of Entresto.  Increase carvedilol to 12.5 mg bid.  Continue lasix 40 mg daily.    HTN- Pt to monitor blood pressure on above regimen.        -At clinic visit on 6/21/2019, Mrs. Malik reports doing well with no chest pain, SOB, or LE edema.  Tolerating Entresto 97/103  mg bid.  Pt states she returned the Life Vest because it was uncomfortable.    Plan:   New Onset CHF/Dilated Cardiomyopathy- Pt with non-ischemic cardiomyopathy with NYHA class II symptoms.  Check cmp today.  If appropriate, start spironolactone 25 mg daily.  Repeat cmp in 1 week and 1 month.  Continue Entresto to 97/103 mg bid; carvedilol to 12.5 mg bid; lasix 40 mg daily.    HTN- Pt to monitor blood pressure on above regimen.     HPI:  Mrs. Malik reports doing well with no chest, significant LE or SOB.      Past Medical History:   Diagnosis Date    Cardiomyopathy     CHF (congestive heart failure)     Hypertension      Past Surgical History:   Procedure Laterality Date    CATHETERIZATION, HEART, BOTH LEFT AND RIGHT N/A 1/31/2019    Performed by [facility] De Jesus Thomas [facility] at [facility] C[facility] LAB    HYSTERECTOMY, TOTAL, ABDOMINAL, WITH BILATERAL SALPINGO-OOPHORECTOMY N/A 11/12/2018    Performed by Katy Cormier MD at [facility] OR    REMOVAL-MASS (VAGINAL) N/A 11/12/2018    Performed by [facility]y [facility]ormier [facility] at [facility] OR    tubal ligation      VAGINAL DELIVERY      x 5     Social History     Socioeconomic History    Marital status: Single     Spouse name: Not on file    Number of children: Not on file    Years of education: Not on file    Highest education level: Not on file   Occupational History    Not on file   Social Needs    Financial resource strain: Not on file    Food insecurity:     Worry: Not on file     Inability: Not on file    Transportation needs:     Medical: Not on file     Non-medical: Not on file   Tobacco Use    Smoking status: Never Smoker    Smokeless tobacco: Never Used   Substance and Sexual Activity    Alcohol use: No    Drug use: No    Sexual activity: Yes     Partners: Male   Lifestyle    Physical activity:     Days per week: Not on file     Minutes per session: Not on file    Stress: Not on file   Relationships    Social connections:     Talks on phone: Not on file     Gets together:  "Not on file     Attends Hindu service: Not on file     Active member of club or organization: Not on file     Attends meetings of clubs or organizations: Not on file     Relationship status: Not on file   Other Topics Concern    Not on file   Social History Narrative    Not on file     Family History   Problem Relation Age of Onset    Hypertension Mother     Stroke Mother     Kidney failure Father     Hypertension Father        Review of patient's allergies indicates:   Allergen Reactions    Peas Swelling       Medication List with Changes/Refills   Current Medications    CARVEDILOL (COREG) 12.5 MG TABLET    Take 1 tablet (12.5 mg total) by mouth 2 (two) times daily with meals.    FUROSEMIDE (LASIX) 40 MG TABLET    Take 1 tablet (40 mg total) by mouth 2 (two) times daily.    SACUBITRIL-VALSARTAN (ENTRESTO)  MG PER TABLET    Take 1 tablet by mouth 2 (two) times daily.    SPIRONOLACTONE (ALDACTONE) 25 MG TABLET    Take 1 tablet (25 mg total) by mouth once daily.       Review of Systems  Constitution: Denies chills, fever, and sweats.  HENT: Denies headaches or blurry vision.  Cardiovascular: Denies chest pain or irregular heart beat.  Respiratory: Positive for shortness of breath on exertion.  Gastrointestinal: Denies abdominal pain, nausea, or vomiting.  Musculoskeletal: Denies muscle cramps.  Neurological: Denies dizziness or focal weakness.  Psychiatric/Behavioral: Normal mental status.  Hematologic/Lymphatic: Denies bleeding problem or easy bruising/bleeding.  Skin: Denies rash or suspicious lesions    Physical Examination  /80 (BP Location: Left arm, Patient Position: Sitting, BP Method: X-Large (Automatic))   Pulse 85   Ht 5' 2" (1.575 m)   Wt 82.2 kg (181 lb 3.2 oz)   LMP 05/04/2018 Comment: Fibroids bleeding.  SpO2 98%   BMI 33.14 kg/m²     Constitutional: No acute distress, conversant  HEENT: Sclera anicteric, Pupils equal, round and reactive to light, extraocular motions intact, " Oropharynx clear  Neck: No JVD, no carotid bruits  Cardiovascular: regular rate and rhythm, no murmur, rubs or gallops, normal S1/S2  Pulmonary: Clear to auscultation bilaterally  Abdominal: Abdomen soft, nontender, nondistended, positive bowel sounds  Extremities: No lower extremity edema,   Pulses:  Carotid pulses are 2+ on the right side, and 2+ on the left side.  Radial pulses are 2+ on the right side, and 2+ on the left side.   Femoral pulses are 2+ on the right side, and 2+ on the left side.  Skin: No ecchymosis, erythema, or ulcers  Psych: Alert and oriented x 3, appropriate affect  Neuro: CNII-XII intact, no focal deficits    Labs:  Most Recent Data  CBC:   Lab Results   Component Value Date    WBC 7.99 01/28/2019    HGB 9.6 (L) 01/28/2019    HCT 33.0 (L) 01/28/2019     01/28/2019    MCV 71 (L) 01/28/2019    RDW 20.0 (H) 01/28/2019     BMP:   Lab Results   Component Value Date     07/02/2019    K 4.2 07/02/2019     07/02/2019    CO2 32 (H) 07/02/2019    BUN 22 (H) 07/02/2019    CREATININE 0.95 07/02/2019     (H) 07/02/2019    CALCIUM 10.1 07/02/2019    MG 2.1 01/04/2019     LFTS;   Lab Results   Component Value Date    PROT 8.4 07/02/2019    ALBUMIN 4.4 07/02/2019    BILITOT 0.6 07/02/2019    AST 22 07/02/2019    ALKPHOS 89 07/02/2019    ALT 15 07/02/2019     COAGS:   Lab Results   Component Value Date    INR 1.3 (H) 01/28/2019     FLP:   Lab Results   Component Value Date    CHOL 156 09/13/2018    HDL 38 (L) 09/13/2018    LDLCALC 102.6 09/13/2018    TRIG 77 09/13/2018    CHOLHDL 24.4 09/13/2018     CARDIAC:   Lab Results   Component Value Date    TROPONINI 0.014 01/04/2019     (H) 01/03/2019       EKG 1/3/2019:  Sinus tachycardia  Left bundle branch block    Echo 1/4/2019:  · Severely decreased left ventricular systolic function. The estimated ejection fraction is 15%  · Left ventricular diastolic dysfunction.  · Moderate left ventricular enlargement.  · Normal right  ventricular systolic function.  · The estimated PA systolic pressure is 21 mm Hg      Assessment/Plan:  Corrine Malik is a 54 y.o. female with a past medical history of new onset CHF (EF 15%), fibroids s/p total abdominal hysterectomy w/ bilateral salpingoophorectomy, who presents for a follow up appointment.      1. New Onset CHF/Dilated Cardiomyopathy- Pt with non-ischemic cardiomyopathy with NYHA class II symptoms.  Check cmp today.  If appropriate, continue spironolactone 25 mg daily.  Continue Entresto to 97/103 mg bid; carvedilol to 12.5 mg bid; lasix 40 mg daily.      2. HTN- Pt to monitor blood pressure on above regimen.        Follow up in 3 months with echo prior    Total duration of face to face visit time 30 minutes.  Total time spent counseling greater than fifty percent of total visit time.  Counseling included discussion regarding imaging findings, diagnosis, possibilities, treatment options, risks and benefits.  The patient had many questions regarding the options and long-term effects.    Jose De Jesus Thomas MD, PhD  Interventional Cardiology

## 2019-07-23 NOTE — PATIENT INSTRUCTIONS
Assessment/Plan:  Corrine Malik is a 54 y.o. female with a past medical history of new onset CHF (EF 15%), fibroids s/p total abdominal hysterectomy w/ bilateral salpingoophorectomy, who presents for a follow up appointment.      1. New Onset CHF/Dilated Cardiomyopathy- Pt with non-ischemic cardiomyopathy with NYHA class II symptoms.  Check cmp today.  If appropriate, continue spironolactone 25 mg daily.  Continue Entresto to 97/103 mg bid; carvedilol to 12.5 mg bid; lasix 40 mg daily.      2. HTN- Pt to monitor blood pressure on above regimen.        Follow up in 3 months with echo prior

## 2019-08-02 ENCOUNTER — LAB VISIT (OUTPATIENT)
Dept: LAB | Facility: HOSPITAL | Age: 54
End: 2019-08-02
Attending: INTERNAL MEDICINE
Payer: MEDICAID

## 2019-08-02 DIAGNOSIS — I42.0 CARDIOMYOPATHY, DILATED: ICD-10-CM

## 2019-08-02 DIAGNOSIS — I10 ESSENTIAL HYPERTENSION: ICD-10-CM

## 2019-08-02 DIAGNOSIS — I42.8 NONISCHEMIC CARDIOMYOPATHY: ICD-10-CM

## 2019-08-02 DIAGNOSIS — I50.42 CHRONIC COMBINED SYSTOLIC AND DIASTOLIC CONGESTIVE HEART FAILURE: ICD-10-CM

## 2019-08-02 DIAGNOSIS — I50.20 NYHA CLASS 2 HEART FAILURE WITH REDUCED EJECTION FRACTION: ICD-10-CM

## 2019-08-02 LAB
ALBUMIN SERPL BCP-MCNC: 4 G/DL (ref 3.5–5.2)
ALP SERPL-CCNC: 88 U/L (ref 38–126)
ALT SERPL W/O P-5'-P-CCNC: 15 U/L (ref 10–44)
ANION GAP SERPL CALC-SCNC: 10 MMOL/L (ref 8–16)
AST SERPL-CCNC: 21 U/L (ref 15–46)
BILIRUB SERPL-MCNC: 0.3 MG/DL (ref 0.1–1)
BUN SERPL-MCNC: 21 MG/DL (ref 7–17)
CALCIUM SERPL-MCNC: 9.6 MG/DL (ref 8.7–10.5)
CHLORIDE SERPL-SCNC: 99 MMOL/L (ref 95–110)
CO2 SERPL-SCNC: 31 MMOL/L (ref 23–29)
CREAT SERPL-MCNC: 0.93 MG/DL (ref 0.5–1.4)
EST. GFR  (AFRICAN AMERICAN): >60 ML/MIN/1.73 M^2
EST. GFR  (NON AFRICAN AMERICAN): >60 ML/MIN/1.73 M^2
GLUCOSE SERPL-MCNC: 169 MG/DL (ref 70–110)
POTASSIUM SERPL-SCNC: 3.9 MMOL/L (ref 3.5–5.1)
PROT SERPL-MCNC: 7.7 G/DL (ref 6–8.4)
SODIUM SERPL-SCNC: 140 MMOL/L (ref 136–145)

## 2019-08-02 PROCEDURE — 80053 COMPREHEN METABOLIC PANEL: CPT | Mod: PO

## 2019-08-02 PROCEDURE — 36415 COLL VENOUS BLD VENIPUNCTURE: CPT | Mod: PO

## 2019-10-03 ENCOUNTER — TELEPHONE (OUTPATIENT)
Dept: CARDIOLOGY | Facility: CLINIC | Age: 54
End: 2019-10-03

## 2019-10-03 NOTE — TELEPHONE ENCOUNTER
----- Message from Carmina Vizcaino sent at 10/3/2019  2:38 PM CDT -----  Contact: [phone]/self  Patient states she is returning your call. Please advise.

## 2019-10-18 ENCOUNTER — HOSPITAL ENCOUNTER (OUTPATIENT)
Dept: CARDIOLOGY | Facility: HOSPITAL | Age: 54
Discharge: HOME | End: 2019-10-18
Attending: INTERNAL MEDICINE
Payer: MEDICAID

## 2019-10-18 DIAGNOSIS — I42.8 NONISCHEMIC CARDIOMYOPATHY: ICD-10-CM

## 2019-10-18 DIAGNOSIS — I42.0 CARDIOMYOPATHY, DILATED: ICD-10-CM

## 2019-10-18 DIAGNOSIS — I50.42 CHRONIC COMBINED SYSTOLIC AND DIASTOLIC CONGESTIVE HEART FAILURE: ICD-10-CM

## 2019-10-18 DIAGNOSIS — I10 ESSENTIAL HYPERTENSION: ICD-10-CM

## 2019-10-18 DIAGNOSIS — I50.20 NYHA CLASS 2 HEART FAILURE WITH REDUCED EJECTION FRACTION: ICD-10-CM

## 2019-10-18 LAB
AORTIC ROOT ANNULUS: 2.31 CM
AORTIC VALVE CUSP SEPERATION: 1.94 CM
AV INDEX (PROSTH): 0.76
AV MEAN GRADIENT: 4 MMHG
AV PEAK GRADIENT: 5 MMHG
AV VALVE AREA: 2.47 CM2
AV VELOCITY RATIO: 0.75
CV ECHO LV RWT: 0.42 CM
DOP CALC AO PEAK VEL: 1.14 M/S
DOP CALC AO VTI: 23.57 CM
DOP CALC LVOT AREA: 3.3 CM2
DOP CALC LVOT DIAMETER: 2.04 CM
DOP CALC LVOT PEAK VEL: 0.85 M/S
DOP CALC LVOT STROKE VOLUME: 58.22 CM3
DOP CALCLVOT PEAK VEL VTI: 17.82 CM
E WAVE DECELERATION TIME: 132.76 MSEC
E/A RATIO: 0.74
ECHO LV POSTERIOR WALL: 1.02 CM (ref 0.6–1.1)
FRACTIONAL SHORTENING: 32 % (ref 28–44)
INTERVENTRICULAR SEPTUM: 0.96 CM (ref 0.6–1.1)
IVC PROX: 1 CM
LA MAJOR: 4.62 CM
LA MINOR: 4.8 CM
LA WIDTH: 3.5 CM
LEFT ATRIUM SIZE: 2.92 CM
LEFT ATRIUM VOLUME: 40.9 CM3
LEFT INTERNAL DIMENSION IN SYSTOLE: 3.26 CM (ref 2.1–4)
LEFT VENTRICLE DIASTOLIC VOLUME: 108.5 ML
LEFT VENTRICLE SYSTOLIC VOLUME: 42.86 ML
LEFT VENTRICULAR INTERNAL DIMENSION IN DIASTOLE: 4.82 CM (ref 3.5–6)
LEFT VENTRICULAR MASS: 169.04 G
MV A" WAVE DURATION": 100 MSEC
MV PEAK A VEL: 0.91 M/S
MV PEAK E VEL: 0.67 M/S
PISA TR MAX VEL: 2.1 M/S
PULM VEIN S/D RATIO: 1.36
PV PEAK D VEL: 0.44 M/S
PV PEAK S VEL: 0.6 M/S
PV PEAK VELOCITY: 0.73 CM/S
RA MAJOR: 3.97 CM
RA PRESSURE: 3 MMHG
RA WIDTH: 3.32 CM
RIGHT VENTRICULAR END-DIASTOLIC DIMENSION: 2.33 CM
SINUS: 2.5 CM
TR MAX PG: 18 MMHG
TRICUSPID ANNULAR PLANE SYSTOLIC EXCURSION: 1.77 CM
TV REST PULMONARY ARTERY PRESSURE: 21 MMHG

## 2019-10-18 PROCEDURE — 93306 TTE W/DOPPLER COMPLETE: CPT | Mod: 26,,, | Performed by: INTERNAL MEDICINE

## 2019-10-18 PROCEDURE — 93306 TTE W/DOPPLER COMPLETE: CPT | Mod: PO

## 2019-10-18 PROCEDURE — 93306 TRANSTHORACIC ECHO (TTE) COMPLETE (CUPID ONLY): ICD-10-PCS | Mod: 26,,, | Performed by: INTERNAL MEDICINE

## 2019-10-22 NOTE — PROGRESS NOTES
Spiritual Care Note    Patient Information     Patient's Name: Richard Hubbard II   MRN: [id number]    YOB: 1978   Age and Gender: 41 y.o. male   Service Area: Rehab   Room (and Bed): Room/bed info not found   Ethnicity or Nationality:     Primary Language: English   Restoration/Spiritual preference: Episcopalian   Place of Residence: [place], [place]   Family/Friends/Others Present: No   Clinical Team Present: No   Medical Diagnosis(-es)/Procedure(s): Pelvic Fracture   Code Status: Full Code    Date of Admission: (Not on file)   Length of Stay: 0 days        Spiritual Care Provider Information:  Name of Spiritual Care Provider: Ivette Go  Title of Spiritual Care Provider: Associate   Phone Number: [phone]  E-mail: [email or web address]  Total time : 30 minutes    Spiritual Screen Results:    Gen Nursing  Spiritual Screen  Would you like to receive a visit from our Spiritual Care team or your own Christian or spiritual leader?: No     Palliative Care         Encounter/Request Information  Encounter/Request Type   Visited With: Patient  Nature of the Visit: Initial  Continue Visiting: Yes  Next Follow-up Date: 10/24/19  General Visit: Yes  Referral From/ Origin of Request: Epic nursing    Religous Needs/Values  Restoration Needs Visit  Restoration Needs: Prayer  Ritual Needs Visit  Ritual Needs: [place]    Spiritual Assessment     Spiritual Care Encounters    Observations/Symptoms: Accepting, Thankfulness    Interaction/Conversation: [facility] had received request when this pt was inpatient and visited him when he was transferred to .  He narrated an extenive medical history:  a fall resulting in a pelvic fracture with delayed reyes, complicated by RA and rectal bleed and requiring surgery to address an infection.  Pt is concerned about his family in [place]; his wife works full-time and also, in his absence, needs to care for their two children, 4 years old and 17 months.  Pt's mother  Chief Complaint   Patient presents with    Vaginal Bleeding       HISTORY OF PRESENT ILLNESS:   Corrine Malik is a 53 y.o. female  who presents for well woman exam and to f/u from PMB.  Patient's last menstrual period was 05/04/2018.. Menopause at age 47  She has complains of PMB. She started bleeding like a period on and off in May then a month ago she started having very heavy bleeding on and off about golf ball size. She went to the ER yesterday b/c her PCP noted she was anemic and she received 2 units of blood. I noted a mass coming through the cervix but was unable to biopsy it in the hospital so had her come here today for further evaluation.         History reviewed. No pertinent past medical history.     History reviewed. No pertinent surgical history.      Social History     Socioeconomic History    Marital status: Single     Spouse name: Not on file    Number of children: Not on file    Years of education: Not on file    Highest education level: Not on file   Social Needs    Financial resource strain: Not on file    Food insecurity - worry: Not on file    Food insecurity - inability: Not on file    Transportation needs - medical: Not on file    Transportation needs - non-medical: Not on file   Occupational History    Not on file   Tobacco Use    Smoking status: Never Smoker    Smokeless tobacco: Never Used   Substance and Sexual Activity    Alcohol use: No    Drug use: No    Sexual activity: Not on file   Other Topics Concern    Not on file   Social History Narrative    Not on file       History reviewed. No pertinent family history.      OB History   No data available       COMPREHENSIVE GYN HISTORY:  PAP History: Denies abnormal Paps, had one last year at [facility]   Infection History: Denies STDs. Denies PID.  Benign History: Denies uterine fibroids. Denies ovarian cysts. Denies endometriosis Denies other conditions.  Cancer History: Denies cervical cancer. Denies uterine  "cancer or hyperplasia. Denies ovarian cancer. Denies vulvar cancer or pre-cancer. Denies vaginal cancer or pre-cancer. Denies breast cancer. Denies colon cancer.  Cycle: 11/mon2-3 days, no pain, menopause at 47    ROS:  GENERAL: Denies weight gain or weight loss. Feeling well overall.   SKIN: Denies rash or lesions.   HEAD: Denies headache.   NODES: Denies enlarged lymph nodes.   CHEST: Denies shortness of breath.   ABDOMEN: No abdominal pain, constipation, diarrhea, nausea, vomiting or rectal bleeding.   URINARY: No frequency, dysuria, hematuria, or burning on urination.  REPRODUCTIVE: See HPI.   BREASTS: The patient denies pain, lumps, or nipple discharge.       BP (!) 148/88   Ht 5' 2" (1.575 m)   Wt 81.6 kg (180 lb)   LMP 05/04/2018   BMI 32.92 kg/m²     APPEARANCE: Well nourished, well developed, in no acute distress.  NECK: Neck symmetric without  thyromegaly.  NODES: No inguinal, cervical lymph node enlargement.  CHEST: Lungs clear to auscultation.  HEART: Regular rate and rhythm, no murmurs, rubs or gallops.  ABDOMEN: Soft. No tenderness or masses. No hernias. No hepatosplenomegaly.  BREASTS: Symmetrical, no skin changes or visible lesions. No palpable masses, nipple discharge or adenopathy bilaterally.  PELVIC:   VULVA: No lesions. Normal female genitalia.  URETHRAL MEATUS: Normal size and location, no lesions, no prolapse.  URETHRA: No masses, tenderness, prolapse or scarring.  VAGINA: Moist and well rugated, no discharge, no significant cystocele or rectocele.  CERVIX: unable to visualize fully as there was about a 4 cm mass with some vascularity that on bimanual exam appears to be coming through the cervix.   UTERUS: 16 week sized, globular shape, mobile but wide, non-tender, bladder base nontender.  ADNEXA: No masses or tenderness.  PERINEUM: Normal, mo masses    Data Reviewed:     Lipid profile: Date:   Lab Results   Component Value Date    CHOL 156 09/13/2018     Lab Results   Component Value Date " "helps care for the children during the day, but is unable to lift the toddler.  Pt requested prayer for himself and his family, and also accepted a blesing for himself.  He thanked the  and said, \"You can come by anytime you have time.  My family can't visit very often, so I don't have much social interaction.\"    Assessment: Need, Distress    Need: Family Issues/Concern, Seeking Spiritual Assistance and Support    Distress: Coping, Loneliness    Interventions: Prayer, blessing, conversation    Outcomes: Ability to Communicate with Truth and Honesty, Connectedness with the Holy/with God, Coping    Plan: Weekly Visits    Notes:            "    HDL 38 (L) 09/13/2018     Lab Results   Component Value Date    LDLCALC 102.6 09/13/2018     Lab Results   Component Value Date    TRIG 77 09/13/2018     Lab Results   Component Value Date    CHOLHDL 24.4 09/13/2018     TSH:   Lab Results   Component Value Date    TSH 1.100 09/13/2018     Uterus:  Size: 14.4 x 6.9 x 9.1 cm  Masses: There are numerous uterine fibroids, 3 are measured, one intramural inferiorly measure 5.7 cm.  One intramural near the fundus measures 6.3 cm.  One subserosal posteriorly measures 3.6 cm.  Endometrium: It is not well seen..  The bilateral ovaries were not visualized.  The bilateral adnexa appear normal.    Procedure: biopsy of cervical mass  09/14/2018  Consent obtained. Patient placed in lithotomy position. Mass cleaned with betadine and alligator biopsy used to sample piece of mass. Hemostatics obtained with pressure and silver nitrate. Patient tolerated it well. Bleeding precautions given.     1. Encounter for annual routine gynecological examination    2. Screening mammogram, encounter for    3. Uterine leiomyoma, unspecified location    4. Mass of cervix    5. Symptomatic anemia        A/P 1. Routine gyn annual exam. s/p normal breast exam and MMG ordered.   Lipid Profile, needed every 5 years, up to date. Fasting glucose, needed every 3 years, up to date.   TSH, needed every 5 years, up to date.   Colonoscopy will send referal to [facility] .   2. Elevated BP likely has HTN, f/u with PCP   3. The PMB is likely coming from a prolapsed uterine fibroid which we biopsied today.  Discussed with patient that were not likely to get a pap smear but she reports a normal one last year so will request records. Discussed with her our options would be, if the mass isn't cancer, to remove the mass or to do a hysterectomy with the understanding that even if the mass is a prolapsed fibroid that we could still be missing endometrial cancer as we wouldn't be able to do sampling. I think at this point  the most likely cause of the AUB is the prolapsing mass.  She would like to have a hysterectomy. Will await biopsy results.   4. Anemia requiring 2 U PRBC: should do iron BID and will continue provera

## 2019-10-31 ENCOUNTER — OFFICE VISIT (OUTPATIENT)
Dept: CARDIOLOGY | Facility: CLINIC | Age: 54
End: 2019-10-31
Payer: MEDICAID

## 2019-10-31 VITALS
HEIGHT: 62 IN | HEART RATE: 63 BPM | WEIGHT: 187 LBS | DIASTOLIC BLOOD PRESSURE: 70 MMHG | SYSTOLIC BLOOD PRESSURE: 130 MMHG | BODY MASS INDEX: 34.41 KG/M2 | OXYGEN SATURATION: 98 %

## 2019-10-31 DIAGNOSIS — I10 ESSENTIAL HYPERTENSION: ICD-10-CM

## 2019-10-31 DIAGNOSIS — I42.8 NICM (NONISCHEMIC CARDIOMYOPATHY): ICD-10-CM

## 2019-10-31 DIAGNOSIS — I50.42 CHRONIC COMBINED SYSTOLIC AND DIASTOLIC CONGESTIVE HEART FAILURE: ICD-10-CM

## 2019-10-31 DIAGNOSIS — I50.20 NYHA CLASS 2 HEART FAILURE WITH REDUCED EJECTION FRACTION: Primary | ICD-10-CM

## 2019-10-31 PROCEDURE — 99213 OFFICE O/P EST LOW 20 MIN: CPT | Mod: PBBFAC,PN | Performed by: INTERNAL MEDICINE

## 2019-10-31 PROCEDURE — 99214 PR OFFICE/OUTPT VISIT, EST, LEVL IV, 30-39 MIN: ICD-10-PCS | Mod: S$PBB,,, | Performed by: INTERNAL MEDICINE

## 2019-10-31 PROCEDURE — 99999 PR PBB SHADOW E&M-EST. PATIENT-LVL III: CPT | Mod: PBBFAC,,, | Performed by: INTERNAL MEDICINE

## 2019-10-31 PROCEDURE — 99214 OFFICE O/P EST MOD 30 MIN: CPT | Mod: S$PBB,,, | Performed by: INTERNAL MEDICINE

## 2019-10-31 PROCEDURE — 99999 PR PBB SHADOW E&M-EST. PATIENT-LVL III: ICD-10-PCS | Mod: PBBFAC,,, | Performed by: INTERNAL MEDICINE

## 2019-10-31 NOTE — ASSESSMENT & PLAN NOTE
Controlled.  BP goal < 140/90.  Compliant w/ medical therapy.  Monitors BP at home, 120s/70s.   - continue medical therapy  - encouraged risk factor and lifestyle modifications

## 2019-10-31 NOTE — ASSESSMENT & PLAN NOTE
Compensated.  Stage B, Class II.  Pt compliant w/ medical therapy.  LVEF recovered to 60% from 15%.  Normal coronaries per [facility] 1/2019.  - continue medical therapy  - continue risk factor and lifestyle modifications

## 2019-10-31 NOTE — PROGRESS NOTES
Subjective:    Patient ID:  Corrine Malik is a 54 y.o. female who presents for follow-up of Results (ECHO)      PCP: Primary Doctor No     HPI  Pt is a 55 yo F w/ PMH of HTN, NICM recovered from 15%--> 60% (?stress induced cardiomyopathy), and CHF who presents for f/u appt and obtain echo results.  She last saw Dr. Thomas 7/23/19 for her cardiomyopathy and has been doing well.  She denies cp, sob, edema, orthopnea, PND, presyncope, palpitations, LOC, or claudication.  She is compliant w/ medical therapy and denies any side effects.  She mentions that she is active and denies any limitations (mud fishing and stationary bike).          Past Medical History:   Diagnosis Date    Cardiomyopathy     CHF (congestive heart failure)     Hypertension      Past Surgical History:   Procedure Laterality Date    CATHETERIZATION OF BOTH LEFT AND RIGHT HEART N/A 1/31/2019    Procedure: CATHETERIZATION, HEART, BOTH LEFT AND RIGHT;  Surgeon: Jose De Jesus Thomas MD PhD;  Location: [facility] CATH LAB;  Service: Cardiology;  Laterality: N/A;    TOTAL ABDOMINAL HYSTERECTOMY W/ BILATERAL SALPINGOOPHORECTOMY N/A 11/12/2018    Procedure: HYSTERECTOMY, TOTAL, ABDOMINAL, WITH BILATERAL SALPINGO-OOPHORECTOMY;  Surgeon: Katy Cormier MD;  Location: [facility] OR;  Service: OB/GYN;  Laterality: N/A;    tubal ligation      VAGINAL DELIVERY      x 5     Social History     Socioeconomic History    Marital status: Single     Spouse name: Not on file    Number of children: Not on file    Years of education: Not on file    Highest education level: Not on file   Occupational History    Not on file   Social Needs    Financial resource strain: Not on file    Food insecurity:     Worry: Not on file     Inability: Not on file    Transportation needs:     Medical: Not on file     Non-medical: Not on file   Tobacco Use    Smoking status: Never Smoker    Smokeless tobacco: Never Used   Substance and Sexual Activity    Alcohol use: No    Drug  use: No    Sexual activity: Yes     Partners: Male   Lifestyle    Physical activity:     Days per week: Not on file     Minutes per session: Not on file    Stress: Not on file   Relationships    Social connections:     Talks on phone: Not on file     Gets together: Not on file     Attends Buddhist service: Not on file     Active member of club or organization: Not on file     Attends meetings of clubs or organizations: Not on file     Relationship status: Not on file   Other Topics Concern    Not on file   Social History Narrative    Not on file     Family History   Problem Relation Age of Onset    Hypertension Mother     Stroke Mother     Kidney failure Father     Hypertension Father        Review of patient's allergies indicates:   Allergen Reactions    Peas Swelling       Medication List with Changes/Refills   Current Medications    CARVEDILOL (COREG) 12.5 MG TABLET    Take 1 tablet (12.5 mg total) by mouth 2 (two) times daily with meals.    FUROSEMIDE (LASIX) 40 MG TABLET    Take 1 tablet (40 mg total) by mouth 2 (two) times daily.    SACUBITRIL-VALSARTAN (ENTRESTO)  MG PER TABLET    Take 1 tablet by mouth 2 (two) times daily.    SPIRONOLACTONE (ALDACTONE) 25 MG TABLET    Take 1 tablet (25 mg total) by mouth once daily.       Review of Systems   Constitution: Negative for diaphoresis and fever.   HENT: Negative for congestion and hearing loss.    Eyes: Negative for blurred vision and pain.   Cardiovascular: Negative for chest pain, claudication, dyspnea on exertion, leg swelling, near-syncope, palpitations and syncope.   Respiratory: Negative for shortness of breath and sleep disturbances due to breathing.    Hematologic/Lymphatic: Negative for bleeding problem. Does not bruise/bleed easily.   Skin: Negative for color change and poor wound healing.   Gastrointestinal: Negative for abdominal pain and nausea.   Genitourinary: Negative for bladder incontinence and flank pain.   Neurological:  "Negative for focal weakness and light-headedness.        Objective:   /70 (BP Location: Left arm, Patient Position: Sitting, BP Method: X-Large (Manual))   Pulse 63   Ht 5' 2" (1.575 m)   Wt 84.8 kg (187 lb)   LMP 05/04/2018 Comment: Fibroids bleeding.  SpO2 98%   BMI 34.20 kg/m²    Physical Exam   Constitutional: She is oriented to person, place, and time. She appears well-developed and well-nourished.   HENT:   Head: Normocephalic and atraumatic.   Mouth/Throat: Oropharynx is clear and moist.   Eyes: Pupils are equal, round, and reactive to light. EOM are normal. No scleral icterus.   Neck: Normal range of motion. Neck supple. No JVD present.   Cardiovascular: Normal rate, regular rhythm, S1 normal, S2 normal and intact distal pulses. Exam reveals no gallop and no friction rub.   No murmur heard.  Pulmonary/Chest: Effort normal and breath sounds normal. No respiratory distress. She has no wheezes. She has no rales. She exhibits no tenderness.   Abdominal: Soft. Bowel sounds are normal. She exhibits no distension and no mass. There is no tenderness. There is no rebound.   Musculoskeletal: Normal range of motion. She exhibits no edema or tenderness.   Neurological: She is alert and oriented to person, place, and time. She displays normal reflexes. Coordination normal.   Skin: Skin is warm and dry. She is not diaphoretic. No pallor.   Psychiatric: She has a normal mood and affect. Her behavior is normal. Judgment normal.         Assessment:       1. NYHA class 2 heart failure with reduced ejection fraction    2. NICM (nonischemic cardiomyopathy)    3. Chronic combined systolic and diastolic congestive heart failure    4. Essential hypertension         Plan:         NICM (nonischemic cardiomyopathy)  Compensated.  Stage B, Class II.  Pt compliant w/ medical therapy.  LVEF recovered to 60% from 15%.  Normal coronaries per [facility] 1/2019.  - continue medical therapy  - continue risk factor and lifestyle " modifications    Congestive heart failure  Compensated.  Stage B, Class I.  LVEF 60% from 15%.  - plan as above    HTN (hypertension)  Controlled.  BP goal < 140/90.  Compliant w/ medical therapy.  Monitors BP at home, 120s/70s.   - continue medical therapy  - encouraged risk factor and lifestyle modifications       NYHA class 2 heart failure with reduced ejection fraction  Stage B, Class II.  LVEF recovered to 60% from 15%.   - plan as above      Total duration of face to face visit time 30 minutes.  Total time spent counseling greater than fifty percent of total visit time.  Counseling included discussion regarding imaging findings, diagnosis, possibilities, treatment options, risks and benefits.  The patient had many questions regarding the options and long-term effects      Hermelindo Gordillo M.D.  Interventional Cardiology

## 2020-03-31 DIAGNOSIS — I42.8 NONISCHEMIC CARDIOMYOPATHY: ICD-10-CM

## 2020-03-31 RX ORDER — SACUBITRIL AND VALSARTAN 49; 51 MG/1; MG/1
TABLET, FILM COATED ORAL
Qty: 180 TABLET | Refills: 3 | OUTPATIENT
Start: 2020-03-31

## 2020-04-02 DIAGNOSIS — I42.8 NONISCHEMIC CARDIOMYOPATHY: ICD-10-CM

## 2020-04-02 RX ORDER — SACUBITRIL AND VALSARTAN 49; 51 MG/1; MG/1
TABLET, FILM COATED ORAL
Qty: 90 TABLET | Refills: 3 | OUTPATIENT
Start: 2020-04-02

## 2020-04-02 NOTE — TELEPHONE ENCOUNTER
Dr Gordillo would like Ms Malik to start taking Entresto 97/103 BID. She was supposed to be taking that strength 6 mos ago but some how she has been taking Entresto 49/51 BID. Patient was advised to start taking Entresto 97/103 BID. New script sent to [facility] in [place] today.

## 2020-04-02 NOTE — TELEPHONE ENCOUNTER
----- Message from Nafisa Elder sent at 4/2/2020  9:39 AM CDT -----  Contact: pt    Pt called stating that she needs a refill on sacubitril-valsartan (ENTRESTO)  mg per tablet. Pt is asking for a 90 day supply Pt and pharmacy have reached out to the office for this to be filled and no response     Pharmacy info [phone]    Pt can be reached at [phone]

## 2020-07-09 RX ORDER — SPIRONOLACTONE 25 MG/1
TABLET ORAL
Qty: 30 TABLET | Refills: 0 | Status: SHIPPED | OUTPATIENT
Start: 2020-07-09 | End: 2020-08-14

## 2020-09-02 ENCOUNTER — OFFICE VISIT (OUTPATIENT)
Dept: CARDIOLOGY | Facility: CLINIC | Age: 55
End: 2020-09-02
Payer: MEDICAID

## 2020-09-02 VITALS
DIASTOLIC BLOOD PRESSURE: 76 MMHG | BODY MASS INDEX: 35.3 KG/M2 | HEIGHT: 62 IN | WEIGHT: 191.81 LBS | HEART RATE: 75 BPM | OXYGEN SATURATION: 98 % | SYSTOLIC BLOOD PRESSURE: 130 MMHG

## 2020-09-02 DIAGNOSIS — I50.42 CHRONIC COMBINED SYSTOLIC AND DIASTOLIC CHF, NYHA CLASS 1: ICD-10-CM

## 2020-09-02 DIAGNOSIS — I10 ESSENTIAL HYPERTENSION: ICD-10-CM

## 2020-09-02 DIAGNOSIS — D64.9 ANEMIA, UNSPECIFIED TYPE: ICD-10-CM

## 2020-09-02 DIAGNOSIS — I42.8 NICM (NONISCHEMIC CARDIOMYOPATHY): ICD-10-CM

## 2020-09-02 PROCEDURE — 99214 OFFICE O/P EST MOD 30 MIN: CPT | Mod: S$PBB,,, | Performed by: INTERNAL MEDICINE

## 2020-09-02 PROCEDURE — 99999 PR PBB SHADOW E&M-EST. PATIENT-LVL IV: CPT | Mod: PBBFAC,,, | Performed by: INTERNAL MEDICINE

## 2020-09-02 PROCEDURE — 99999 PR PBB SHADOW E&M-EST. PATIENT-LVL IV: ICD-10-PCS | Mod: PBBFAC,,, | Performed by: INTERNAL MEDICINE

## 2020-09-02 PROCEDURE — 99214 OFFICE O/P EST MOD 30 MIN: CPT | Mod: PBBFAC,PN | Performed by: INTERNAL MEDICINE

## 2020-09-02 PROCEDURE — 99214 PR OFFICE/OUTPT VISIT, EST, LEVL IV, 30-39 MIN: ICD-10-PCS | Mod: S$PBB,,, | Performed by: INTERNAL MEDICINE

## 2020-09-02 NOTE — ASSESSMENT & PLAN NOTE
Controlled.  BP goal < 130/80.  Compliant w/ medical therapy.  Monitors BP at home, 120s/70s.   - continue medical therapy  - encouraged risk factor and lifestyle modifications

## 2020-09-02 NOTE — ASSESSMENT & PLAN NOTE
Compensated.  Stage B, Class II.  Pt compliant w/ medical therapy.  LVEF recovered to 60% from 15%.  Normal coronaries per [facility] 1/2019.  - continue medical therapy  - furosemide prn  - continue risk factor and lifestyle modifications

## 2020-09-02 NOTE — PROGRESS NOTES
Subjective:    Patient ID:  Corrine Malik is a 55 y.o. female who presents for follow-up of Follow-up, Hypertension, Congestive Heart Failure, and Cardiomyopathy      PCP: Primary Doctor No     Pt is a 56 yo F w/ PMH of HTN, NICM recovered from 15%--> 60% (?stress induced cardiomyopathy), and CHF who presents for f/u appt.  She last seen 10/31/19 for her cardiomyopathy and had been doing well.  She denies any changes.  She denies cp, sob, edema, orthopnea, PND, presyncope, palpitations, LOC, or claudication.  She is compliant w/ medical therapy and denies any side effects.  She has been monitoring her BP at home and it has been running < 130/70s.  She mentions that she is active and denies any limitations (mud fishing and stationary bike).          Past Medical History:   Diagnosis Date    Cardiomyopathy     CHF (congestive heart failure)     Hypertension      Past Surgical History:   Procedure Laterality Date    CATHETERIZATION OF BOTH LEFT AND RIGHT HEART N/A 1/31/2019    Procedure: CATHETERIZATION, HEART, BOTH LEFT AND RIGHT;  Surgeon: Jose De Jesus Thomas MD PhD;  Location: [facility] CATH LAB;  Service: Cardiology;  Laterality: N/A;    TOTAL ABDOMINAL HYSTERECTOMY W/ BILATERAL SALPINGOOPHORECTOMY N/A 11/12/2018    Procedure: HYSTERECTOMY, TOTAL, ABDOMINAL, WITH BILATERAL SALPINGO-OOPHORECTOMY;  Surgeon: Katy Cormier MD;  Location: [facility] OR;  Service: OB/GYN;  Laterality: N/A;    tubal ligation      VAGINAL DELIVERY      x 5     Social History     Socioeconomic History    Marital status: Single     Spouse name: Not on file    Number of children: Not on file    Years of education: Not on file    Highest education level: Not on file   Occupational History    Not on file   Social Needs    Financial resource strain: Not on file    Food insecurity     Worry: Not on file     Inability: Not on file    Transportation needs     Medical: Not on file     Non-medical: Not on file   Tobacco Use    Smoking  status: Never Smoker    Smokeless tobacco: Never Used   Substance and Sexual Activity    Alcohol use: No    Drug use: No    Sexual activity: Yes     Partners: Male   Lifestyle    Physical activity     Days per week: Not on file     Minutes per session: Not on file    Stress: Not on file   Relationships    Social connections     Talks on phone: Not on file     Gets together: Not on file     Attends Hinduism service: Not on file     Active member of club or organization: Not on file     Attends meetings of clubs or organizations: Not on file     Relationship status: Not on file   Other Topics Concern    Not on file   Social History Narrative    Not on file     Family History   Problem Relation Age of Onset    Hypertension Mother     Stroke Mother     Kidney failure Father     Hypertension Father        Review of patient's allergies indicates:   Allergen Reactions    Peas Swelling       Medication List with Changes/Refills   Current Medications    CARVEDILOL (COREG) 12.5 MG TABLET    TAKE 1 TABLET BY MOUTH TWICE DAILY WITH MEALS    FUROSEMIDE (LASIX) 40 MG TABLET    TAKE 1 TABLET BY MOUTH TWICE DAILY    SACUBITRIL-VALSARTAN (ENTRESTO)  MG PER TABLET    Take 1 tablet by mouth 2 (two) times daily.    SPIRONOLACTONE (ALDACTONE) 25 MG TABLET    Take 1 tablet by mouth once daily       Review of Systems   Constitution: Negative for diaphoresis and fever.   HENT: Negative for congestion and hearing loss.    Eyes: Negative for blurred vision and pain.   Cardiovascular: Negative for chest pain, claudication, dyspnea on exertion, leg swelling, near-syncope, palpitations and syncope.   Respiratory: Negative for shortness of breath and sleep disturbances due to breathing.    Hematologic/Lymphatic: Negative for bleeding problem. Does not bruise/bleed easily.   Skin: Negative for color change and poor wound healing.   Gastrointestinal: Negative for abdominal pain and nausea.   Genitourinary: Negative for bladder  "incontinence and flank pain.   Neurological: Negative for focal weakness and light-headedness.        Objective:   /76 (BP Location: Left arm, Patient Position: Sitting, BP Method: Large (Manual))   Pulse 75   Ht 5' 2" (1.575 m)   Wt 87 kg (191 lb 12.8 oz)   LMP 05/04/2018 Comment: Fibroids bleeding.  SpO2 98%   BMI 35.08 kg/m²    Physical Exam   Constitutional: She is oriented to person, place, and time. She appears well-developed and well-nourished.   HENT:   Head: Normocephalic and atraumatic.   Mouth/Throat: Oropharynx is clear and moist.   Eyes: Pupils are equal, round, and reactive to light. EOM are normal. No scleral icterus.   Neck: Normal range of motion. Neck supple. No JVD present.   Cardiovascular: Normal rate, regular rhythm, S1 normal, S2 normal and intact distal pulses. Exam reveals no gallop and no friction rub.   No murmur heard.  Pulmonary/Chest: Effort normal and breath sounds normal. No respiratory distress. She has no wheezes. She has no rales. She exhibits no tenderness.   Abdominal: Soft. Bowel sounds are normal. She exhibits no distension and no mass. There is no abdominal tenderness. There is no rebound.   Musculoskeletal: Normal range of motion.         General: No tenderness or edema.   Neurological: She is alert and oriented to person, place, and time. She displays normal reflexes. Coordination normal.   Skin: Skin is warm and dry. She is not diaphoretic. No pallor.   Psychiatric: She has a normal mood and affect. Her behavior is normal. Judgment normal.         Assessment:       1. NICM (nonischemic cardiomyopathy)    2. Chronic combined systolic and diastolic CHF, NYHA class 1    3. Essential hypertension    4. Anemia, unspecified type         Plan:         NICM (nonischemic cardiomyopathy)  Compensated.  Stage B, Class II.  Pt compliant w/ medical therapy.  LVEF recovered to 60% from 15%.  Normal coronaries per [facility] 1/2019.  - continue medical therapy  - furosemide prn  - " continue risk factor and lifestyle modifications    Chronic combined systolic and diastolic CHF, NYHA class 1  Compensated.  Stage B, Class I.  LVEF 60% from 15%.  - plan as above    HTN (hypertension)  Controlled.  BP goal < 130/80.  Compliant w/ medical therapy.  Monitors BP at home, 120s/70s.   - continue medical therapy  - encouraged risk factor and lifestyle modifications       Anemia  Management per PCP.        Total duration of face to face visit time 30 minutes.  Total time spent counseling greater than fifty percent of total visit time.  Counseling included discussion regarding imaging findings, diagnosis, possibilities, treatment options, risks and benefits.  The patient had many questions regarding the options and long-term effects      Hermelindo Gordillo M.D.  Interventional Cardiology

## 2020-11-30 DIAGNOSIS — I10 ESSENTIAL HYPERTENSION: ICD-10-CM

## 2020-11-30 DIAGNOSIS — I42.0 CARDIOMYOPATHY, DILATED: ICD-10-CM

## 2020-11-30 DIAGNOSIS — I42.8 NONISCHEMIC CARDIOMYOPATHY: ICD-10-CM

## 2020-11-30 DIAGNOSIS — I50.20 NYHA CLASS 2 HEART FAILURE WITH REDUCED EJECTION FRACTION: ICD-10-CM

## 2020-11-30 RX ORDER — FUROSEMIDE 40 MG/1
TABLET ORAL
Qty: 90 TABLET | Refills: 0 | Status: SHIPPED | OUTPATIENT
Start: 2020-11-30 | End: 2021-03-10 | Stop reason: SDUPTHER

## 2020-11-30 RX ORDER — SPIRONOLACTONE 25 MG/1
TABLET ORAL
Qty: 30 TABLET | Refills: 0 | Status: SHIPPED | OUTPATIENT
Start: 2020-11-30 | End: 2021-01-13 | Stop reason: SDUPTHER

## 2020-11-30 RX ORDER — CARVEDILOL 12.5 MG/1
TABLET ORAL
Qty: 180 TABLET | Refills: 0 | Status: SHIPPED | OUTPATIENT
Start: 2020-11-30 | End: 2021-03-28 | Stop reason: SDUPTHER

## 2021-01-07 RX ORDER — SPIRONOLACTONE 25 MG/1
25 TABLET ORAL DAILY
Qty: 30 TABLET | Refills: 0 | OUTPATIENT
Start: 2021-01-07

## 2021-01-13 ENCOUNTER — PATIENT MESSAGE (OUTPATIENT)
Dept: CARDIOLOGY | Facility: CLINIC | Age: 56
End: 2021-01-13

## 2021-01-13 ENCOUNTER — PATIENT MESSAGE (OUTPATIENT)
Dept: FAMILY MEDICINE | Facility: CLINIC | Age: 56
End: 2021-01-13

## 2021-01-13 DIAGNOSIS — I42.8 NONISCHEMIC CARDIOMYOPATHY: ICD-10-CM

## 2021-01-13 DIAGNOSIS — I42.0 CARDIOMYOPATHY, DILATED: ICD-10-CM

## 2021-01-13 DIAGNOSIS — I10 ESSENTIAL HYPERTENSION: ICD-10-CM

## 2021-01-13 DIAGNOSIS — I50.20 NYHA CLASS 2 HEART FAILURE WITH REDUCED EJECTION FRACTION: ICD-10-CM

## 2021-01-13 RX ORDER — SPIRONOLACTONE 25 MG/1
25 TABLET ORAL DAILY
Qty: 90 TABLET | Refills: 3 | Status: SHIPPED | OUTPATIENT
Start: 2021-01-13 | End: 2022-01-09 | Stop reason: SDUPTHER

## 2021-05-05 NOTE — TELEPHONE ENCOUNTER
Return pt's call and left detailed message.     ----- Message from Ana Plata MA sent at 5/1/2019 11:45 AM CDT -----  Contact: [phone]   Your last office notes states that Ms Malik should increased dosage of Entresto, Increase carvedilol to 6.25 mg bid and lasix 40 mg daily. Please clarify if patient should still be taking Atacand, then refill if necessary.  ----- Message -----  From: Liza Manning  Sent: 5/1/2019  11:30 AM  To: William FERMIN Staff    Patient states Dr. Thomas changed her dosage for candesartan (ATACAND) 16 MG tablet  To 2 pills a day. She needs a refill that will reflect this. [facility] [place].      
1

## 2021-05-21 ENCOUNTER — OFFICE VISIT (OUTPATIENT)
Dept: CARDIOLOGY | Facility: CLINIC | Age: 56
End: 2021-05-21
Payer: MEDICAID

## 2021-05-21 VITALS
HEART RATE: 82 BPM | HEIGHT: 62 IN | WEIGHT: 195 LBS | OXYGEN SATURATION: 98 % | SYSTOLIC BLOOD PRESSURE: 102 MMHG | BODY MASS INDEX: 35.88 KG/M2 | DIASTOLIC BLOOD PRESSURE: 70 MMHG

## 2021-05-21 DIAGNOSIS — I50.20 NYHA CLASS 2 HEART FAILURE WITH REDUCED EJECTION FRACTION: ICD-10-CM

## 2021-05-21 DIAGNOSIS — I50.42 CHRONIC COMBINED SYSTOLIC AND DIASTOLIC CHF, NYHA CLASS 1: ICD-10-CM

## 2021-05-21 DIAGNOSIS — I42.8 NONISCHEMIC CARDIOMYOPATHY: ICD-10-CM

## 2021-05-21 DIAGNOSIS — I42.0 CARDIOMYOPATHY, DILATED: ICD-10-CM

## 2021-05-21 DIAGNOSIS — I10 ESSENTIAL HYPERTENSION: ICD-10-CM

## 2021-05-21 DIAGNOSIS — I42.8 NICM (NONISCHEMIC CARDIOMYOPATHY): ICD-10-CM

## 2021-05-21 PROCEDURE — 99999 PR PBB SHADOW E&M-EST. PATIENT-LVL III: CPT | Mod: PBBFAC,,, | Performed by: INTERNAL MEDICINE

## 2021-05-21 PROCEDURE — 99214 OFFICE O/P EST MOD 30 MIN: CPT | Mod: S$PBB,,, | Performed by: INTERNAL MEDICINE

## 2021-05-21 PROCEDURE — 99214 PR OFFICE/OUTPT VISIT, EST, LEVL IV, 30-39 MIN: ICD-10-PCS | Mod: S$PBB,,, | Performed by: INTERNAL MEDICINE

## 2021-05-21 PROCEDURE — 99999 PR PBB SHADOW E&M-EST. PATIENT-LVL III: ICD-10-PCS | Mod: PBBFAC,,, | Performed by: INTERNAL MEDICINE

## 2021-05-21 PROCEDURE — 99213 OFFICE O/P EST LOW 20 MIN: CPT | Mod: PBBFAC,PN | Performed by: INTERNAL MEDICINE

## 2021-05-21 RX ORDER — CARVEDILOL 12.5 MG/1
12.5 TABLET ORAL 2 TIMES DAILY WITH MEALS
Qty: 180 TABLET | Refills: 3 | Status: SHIPPED | OUTPATIENT
Start: 2021-05-21 | End: 2021-08-10

## 2021-05-21 RX ORDER — SACUBITRIL AND VALSARTAN 97; 103 MG/1; MG/1
1 TABLET, FILM COATED ORAL 2 TIMES DAILY
Qty: 180 TABLET | Refills: 3 | Status: SHIPPED | OUTPATIENT
Start: 2021-05-21 | End: 2022-01-09 | Stop reason: SDUPTHER

## 2022-01-09 DIAGNOSIS — I42.0 CARDIOMYOPATHY, DILATED: ICD-10-CM

## 2022-01-09 DIAGNOSIS — I50.20 NYHA CLASS 2 HEART FAILURE WITH REDUCED EJECTION FRACTION: ICD-10-CM

## 2022-01-09 DIAGNOSIS — I10 ESSENTIAL HYPERTENSION: ICD-10-CM

## 2022-01-09 DIAGNOSIS — I42.8 NONISCHEMIC CARDIOMYOPATHY: ICD-10-CM

## 2022-01-10 RX ORDER — FUROSEMIDE 40 MG/1
40 TABLET ORAL 2 TIMES DAILY
Qty: 90 TABLET | Refills: 3 | Status: SHIPPED | OUTPATIENT
Start: 2022-01-10 | End: 2023-02-13

## 2022-01-10 RX ORDER — SACUBITRIL AND VALSARTAN 97; 103 MG/1; MG/1
1 TABLET, FILM COATED ORAL 2 TIMES DAILY
Qty: 180 TABLET | Refills: 3 | Status: SHIPPED | OUTPATIENT
Start: 2022-01-10 | End: 2022-02-04

## 2022-01-10 RX ORDER — SPIRONOLACTONE 25 MG/1
25 TABLET ORAL DAILY
Qty: 90 TABLET | Refills: 3 | Status: SHIPPED | OUTPATIENT
Start: 2022-01-10 | End: 2023-02-13

## 2022-02-04 ENCOUNTER — TELEPHONE (OUTPATIENT)
Dept: CARDIOLOGY | Facility: CLINIC | Age: 57
End: 2022-02-04
Payer: MEDICAID

## 2022-02-04 NOTE — TELEPHONE ENCOUNTER
----- Message from Rachael Castro sent at 2/4/2022  2:48 PM CST -----  Regarding: [facility]  Contact: [phone]/[facility]  Type:  Pharmacy Calling to Clarify an RX    Name of Caller:   Pharmacy Name:  [facility]  [place], [place] - [address];  Prescription Name: ENTRESTO  mg per tablet  What do they need to clarify?: needs the Diagnosis Code ICD code  Best Call Back Number: [phone]/[facility]  Additional Information:

## 2022-02-04 NOTE — TELEPHONE ENCOUNTER
Called pharmacy and gave them Cardiomyopathy, dilated [I42.0]   Nonischemic cardiomyopathy [I42.8]   NYHA class 2 heart failure with reduced ejection fraction [I50.20]

## (undated) DEVICE — SUT 0 VICRYL / CT-1

## (undated) DEVICE — SEE MEDLINE ITEM 157116

## (undated) DEVICE — SEALER LIGASURE IMPACT 18CM

## (undated) DEVICE — DRESSING AQUACEL ADH 4X10IN

## (undated) DEVICE — MANIFOLD 4 PORT

## (undated) DEVICE — APPLICATOR CHLORAPREP ORN 26ML

## (undated) DEVICE — COVER OVERHEAD SURG LT BLUE

## (undated) DEVICE — SUT PLN GUT 2-0 CT 27 INCH

## (undated) DEVICE — SUT CTD VICRYL 1 UND BR CT

## (undated) DEVICE — SEE MEDLINE ITEM 146355

## (undated) DEVICE — PACK BASIC

## (undated) DEVICE — SEE MEDLINE ITEM 154981

## (undated) DEVICE — ELECTRODE REM PLYHSV RETURN 9

## (undated) DEVICE — DRAPE LAP TIBURON 77X122IN

## (undated) DEVICE — POWDER ARISTA AH 3G

## (undated) DEVICE — GLOVE PROTEXIS HYDROGEL SZ6

## (undated) DEVICE — SUT VICRYL PLUS 0 CT1 18IN

## (undated) DEVICE — BLADE SURG CARBON STEEL #10

## (undated) DEVICE — TRAY FOLEY 16FR INFECTION CONT

## (undated) DEVICE — SEE MEDLINE ITEM 152622

## (undated) DEVICE — DRESSING WND ADH PRIMAPORE 10

## (undated) DEVICE — CONTAINER PATHOLOGY W/LID 32OZ

## (undated) DEVICE — SPONGE LAP 18X18 PREWASHED

## (undated) DEVICE — SEE MEDLINE ITEM 157117

## (undated) DEVICE — ADHESIVE DERMABOND ADVANCED

## (undated) DEVICE — SUT MONOCRYL 3-0 PS-2 UND

## (undated) DEVICE — PANTIES FEMININE NAPKIN LG/XLG

## (undated) DEVICE — SEE MEDLINE ITEM 156955